# Patient Record
Sex: FEMALE | Race: WHITE
[De-identification: names, ages, dates, MRNs, and addresses within clinical notes are randomized per-mention and may not be internally consistent; named-entity substitution may affect disease eponyms.]

---

## 2018-04-18 ENCOUNTER — HOSPITAL ENCOUNTER (EMERGENCY)
Dept: HOSPITAL 97 - ER | Age: 38
Discharge: HOME | End: 2018-04-18
Payer: COMMERCIAL

## 2018-04-18 DIAGNOSIS — S60.212A: Primary | ICD-10-CM
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
## 2018-04-18 PROCEDURE — 99284 EMERGENCY DEPT VISIT MOD MDM: CPT

## 2018-04-18 PROCEDURE — 93005 ELECTROCARDIOGRAM TRACING: CPT

## 2018-04-18 PROCEDURE — 71046 X-RAY EXAM CHEST 2 VIEWS: CPT
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
## 2018-04-18 NOTE — EKG
Test Date:    2018-04-18               Test Time:    11:57:18

Technician:   GADIEL                                     

                                                     

MEASUREMENT RESULTS:                                       

Intervals:                                           

Rate:         68                                     

IA:           132                                    

QRSD:         88                                     

QT:           366                                    

QTc:          389                                    

Axis:                                                

P:            65                                     

IA:           132                                    

QRS:          80                                     

T:            62                                     

                                                     

INTERPRETIVE STATEMENTS:                                       

                                                     

Normal sinus rhythm with sinus arrhythmia

Normal ECG

Compared to ECG 02/03/2018 06:47:19

No significant changes



Electronically Signed On 04-18-18 14:52:22 CDT by Tomy Gomes
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
## 2018-07-11 ENCOUNTER — HOSPITAL ENCOUNTER (EMERGENCY)
Dept: HOSPITAL 97 - ER | Age: 38
Discharge: HOME | End: 2018-07-11
Payer: COMMERCIAL

## 2018-07-11 DIAGNOSIS — R07.89: ICD-10-CM

## 2018-07-11 DIAGNOSIS — R12: Primary | ICD-10-CM

## 2018-07-11 LAB
ALBUMIN SERPL BCP-MCNC: 3.5 G/DL (ref 3.4–5)
ALP SERPL-CCNC: 60 U/L (ref 45–117)
ALT SERPL W P-5'-P-CCNC: 37 U/L (ref 12–78)
AST SERPL W P-5'-P-CCNC: 23 U/L (ref 15–37)
BUN BLD-MCNC: 11 MG/DL (ref 7–18)
CKMB CREATINE KINASE MB: < 1 NG/ML (ref 0.3–3.6)
GLUCOSE SERPLBLD-MCNC: 101 MG/DL (ref 74–106)
HCT VFR BLD CALC: 42.8 % (ref 36–45)
INR BLD: 0.99
LYMPHOCYTES # SPEC AUTO: 2 K/UL (ref 0.7–4.9)
MAGNESIUM SERPL-MCNC: 2 MG/DL (ref 1.8–2.4)
MCH RBC QN AUTO: 31.2 PG (ref 27–35)
MCV RBC: 89.4 FL (ref 80–100)
NT-PROBNP SERPL-MCNC: 18 PG/ML (ref ?–125)
PMV BLD: 9.8 FL (ref 7.6–11.3)
POTASSIUM SERPL-SCNC: 3.4 MMOL/L (ref 3.5–5.1)
RBC # BLD: 4.79 M/UL (ref 3.86–4.86)

## 2018-07-11 PROCEDURE — 81003 URINALYSIS AUTO W/O SCOPE: CPT

## 2018-07-11 PROCEDURE — 93005 ELECTROCARDIOGRAM TRACING: CPT

## 2018-07-11 PROCEDURE — 85730 THROMBOPLASTIN TIME PARTIAL: CPT

## 2018-07-11 PROCEDURE — 85379 FIBRIN DEGRADATION QUANT: CPT

## 2018-07-11 PROCEDURE — 36415 COLL VENOUS BLD VENIPUNCTURE: CPT

## 2018-07-11 PROCEDURE — 82550 ASSAY OF CK (CPK): CPT

## 2018-07-11 PROCEDURE — 84484 ASSAY OF TROPONIN QUANT: CPT

## 2018-07-11 PROCEDURE — 85025 COMPLETE CBC W/AUTO DIFF WBC: CPT

## 2018-07-11 PROCEDURE — 82553 CREATINE MB FRACTION: CPT

## 2018-07-11 PROCEDURE — 99283 EMERGENCY DEPT VISIT LOW MDM: CPT

## 2018-07-11 PROCEDURE — 85610 PROTHROMBIN TIME: CPT

## 2018-07-11 PROCEDURE — 71045 X-RAY EXAM CHEST 1 VIEW: CPT

## 2018-07-11 PROCEDURE — 80053 COMPREHEN METABOLIC PANEL: CPT

## 2018-07-11 PROCEDURE — 80076 HEPATIC FUNCTION PANEL: CPT

## 2018-07-11 PROCEDURE — 83880 ASSAY OF NATRIURETIC PEPTIDE: CPT

## 2018-07-11 PROCEDURE — 83735 ASSAY OF MAGNESIUM: CPT

## 2018-07-11 NOTE — EKG
Test Date:    2018-07-11               Test Time:    02:54:15

Technician:   MICHAEL                                     

                                                     

MEASUREMENT RESULTS:                                       

Intervals:                                           

Rate:         74                                     

PA:           140                                    

QRSD:         96                                     

QT:           384                                    

QTc:          426                                    

Axis:                                                

P:            60                                     

PA:           140                                    

QRS:          86                                     

T:            36                                     

                                                     

INTERPRETIVE STATEMENTS:                                       

                                                     

Normal sinus rhythm with sinus arrhythmia

Normal ECG

Compared to ECG 04/18/2018 11:57:18

No significant changes



Electronically Signed On 07-11-18 06:30:15 CDT by Tomy Gomes

## 2018-07-11 NOTE — EDPHYS
Physician Documentation                                                                           

 [facility]                                                                

Name: Jordyn Bennett                                                                               

Age: 37 yrs                                                                                       

Sex: Female                                                                                       

: 1980                                                                                   

MRN: [id number]                                                                                   

Arrival Date: 2018                                                                          

Time: 02:23                                                                                       

Account#: [id number]                                                                            

Bed 7                                                                                             

Private MD: Efe Farmer H                                                                    

ED Physician Sharath Quinn                                                                      

HPI:                                                                                              

                                                                                             

03:49 This 37 yrs old  Female presents to ER via Ambulatory with complaints of R Arm ps1 

      Pain, Heartburn, Abdominal Pain.                                                            

03:49 The patient or guardian reports chest pain that is located primarily in the not pain    ps1 

      but discomfort with taking a deep breath, additionally feels like she has heartburn.        

      Onset was early this morning. Did not eat dinner. No leg swelling. On birth control. No     

      remitting or exacerbating factors. .                                                        

                                                                                                  

OB/GYN:                                                                                           

02:51 LMP 6/15/2018                                                                           mg2 

                                                                                                  

Historical:                                                                                       

- Allergies:                                                                                      

02:49 No Known Allergies;                                                                     mg2 

- Home Meds:                                                                                      

02:49 birth control [Active];                                                                 mg2 

- PMHx:                                                                                           

02:49 None;                                                                                   mg2 

- PSHx:                                                                                           

02:49 None;                                                                                   mg2 

                                                                                                  

- Immunization history:: Adult Immunizations up to date.                                          

- Social history:: Smoking status: Patient/guardian denies using tobacco.                         

- Ebola Screening: : No symptoms or risks identified at this time.                                

                                                                                                  

                                                                                                  

ROS:                                                                                              

03:49 Constitutional: Negative for fever, chills, and weight loss, Eyes: Negative for injury, ps1 

      pain, redness, and discharge, Neck: Negative for injury, pain, and swelling,                

      Cardiovascular: Negative for chest pain, palpitations, and edema, Back: Negative for        

      injury and pain, MS/Extremity: Negative for injury and deformity, Skin: Negative for        

      injury, rash, and discoloration, Neuro: Negative for headache, weakness, numbness,          

      tingling, and seizure.                                                                      

03:49 Respiratory: Positive for discomfort with deep inspiration.                                 

03:49 Abdomen/GI: Positive for dyspepsia.                                                         

                                                                                                  

Exam:                                                                                             

03:49 Constitutional:  This is a well developed, well nourished patient who is awake, alert,  ps1 

      and in no acute distress. Head/Face:  Normocephalic, atraumatic. Eyes:  Pupils equal        

      round and reactive to light, extra-ocular motions intact.  Lids and lashes normal.          

      Conjunctiva and sclera are non-icteric and not injected. Chest/axilla:  Normal chest        

      wall appearance and motion.  Nontender with no deformity.  No lesions are appreciated.      

      Cardiovascular:  Regular rate and rhythm.  No gallops, murmurs, or rubs.  Normal PMI,       

      no JVD.  No pulse deficits. Respiratory:  Lungs have equal breath sounds bilaterally,       

      clear to auscultation and percussion.  No rales, rhonchi or wheezes noted.  No              

      increased work of breathing, no retractions or nasal flaring. Abdomen/GI:  Soft,            

      non-tender, with normal bowel sounds.  No distension or tympany.  No guarding or            

      rebound.  No evidence of tenderness throughout. Skin:  Warm, dry with normal turgor.        

      Normal color with no rashes, no lesions, and no evidence of cellulitis. MS/ Extremity:      

      Pulses equal, no cyanosis.  Neurovascular intact.  Full, normal range of motion. Neuro:     

       Awake and alert, GCS 15, oriented to person, place, time, and situation.  Cranial          

      nerves II-XII grossly intact. Sensory grossly intact.                                       

                                                                                                  

Vital Signs:                                                                                      

02:51  / 125; Pulse 91; Resp 18; Temp 98.6; Pulse Ox 100% on R/A; Weight 106.59 kg;     mg2 

      Height 5 ft. 6 in. (167.64 cm); Pain 0/10;                                                  

03:30  / 70; Pulse 90; Resp 18; Pulse Ox 98% on R/A; Pain 0/10;                         mg2 

02:51 Body Mass Index 37.93 (106.59 kg, 167.64 cm)                                            mg2 

                                                                                                  

MDM:                                                                                              

03:11 Patient medically screened.                                                             ps1 

03:49 HEART Score: History: Slightly Suspicious (0), ECG: Normal (0), Age: < or = 45 years    ps1 

      (0), Risk Factors: No Risk Factors Known (0), Troponin: < or = 1 x Normal Limit (0).        

      The patient's deep vein thrombosis risk score was calculated as follows: Total Score:       

      0. This patient was found to be at low risk for a deep vein thrombosis by using the         

      Well's assessment criteria. The patient's pulmonary embolism risk score was calculated      

      as follows: Total Score: 0-2 points. This patient was found to be at low risk for a         

      pulmonary embolism by using the Well's assessment criteria. Data reviewed: vital signs,     

      nurses notes, lab test result(s), EKG, radiologic studies, plain films, and as a            

      result, I will discharge patient. Counseling: I had a detailed discussion with the          

      patient and/or guardian regarding: the historical points, exam findings, and any            

      diagnostic results supporting the discharge/admit diagnosis, lab results, radiology         

      results, the need for outpatient follow up, a cardiologist. Special discussion: Based       

      on the patient's history, exam, and Dx evaluation, there is no indication for emergent      

      intervention or inpatient Tx. It is understood by the patient/guardian that if the Sx's     

      persist or worsen they need to return immediately for re-evaluation.                        

                                                                                                  

                                                                                             

02:56 Order name: CBC with Diff; Complete Time: 03:39                                         mg2 

                                                                                             

02:56 Order name: Ckmb; Complete Time: 03:43                                                  mg2 

                                                                                             

02:56 Order name: CPK; Complete Time: 03:43                                                   mg2 

                                                                                             

02:56 Order name: LFT's; Complete Time: 03:43                                                 mg2 

                                                                                             

02:56 Order name: Magnesium; Complete Time: 03:43                                             mg2 

                                                                                             

02:56 Order name: NT PRO-BNP; Complete Time: 03:43                                            mg2 

                                                                                             

02:56 Order name: PT-INR; Complete Time: 03:39                                                mg2 

                                                                                             

02:56 Order name: Ptt, Activated; Complete Time: 03:39                                        mg2 

                                                                                             

02:56 Order name: Troponin (emerg Dept Use Only); Complete Time: 03:39                        mg2 

                                                                                             

02:56 Order name: EKG; Complete Time: 02:57                                                   mg2 

                                                                                             

02:56 Order name: Cardiac monitoring; Complete Time: 03:01                                    mg2 

                                                                                             

02:56 Order name: EKG - Nurse/Tech; Complete Time: 03:01                                      mg2 

                                                                                             

02:56 Order name: IV Saline Lock; Complete Time: 03:01                                        mg2 

                                                                                             

02:56 Order name: Labs collected and sent; Complete Time: 03:01                               mg2 

                                                                                             

02:56 Order name: O2 Per Protocol; Complete Time: 03:01                                       mg2 

                                                                                             

02:56 Order name: O2 Sat Monitoring; Complete Time: 03:01                                     mg2 

                                                                                             

02:56 Order name: Urine Dipstick-Ancillary (obtain specimen); Complete Time: 03:55            mg2 

                                                                                             

02:57 Order name: XRAY Chest (1 view)                                                         [facility] 

                                                                                             

03:10 Order name: Comprehensive Metabolic Panel; Complete Time: 03:43                         EDMS

                                                                                             

03:12 Order name: Urine Dipstick--Ancillary (enter results); Complete Time: 03:25             ms  

                                                                                             

03:15 Order name: D-Dimer; Complete Time: 03:39                                               EDMS

                                                                                             

02:57 Order name: Cardiac monitoring; Complete Time: 02:59                                    ps1 

                                                                                             

02:57 Order name: EKG - Nurse/Tech; Complete Time: 02:59                                      ps1 

                                                                                             

02:57 Order name: IV Saline Lock; Complete Time: 02:59                                        ps1 

                                                                                             

02:57 Order name: Labs collected and sent; Complete Time: 02:59                               ps1 

                                                                                             

02:57 Order name: O2 Per Protocol; Complete Time: 02:59                                       ps1 

                                                                                             

02:57 Order name: O2 Sat Monitoring; Complete Time: 03:00                                     ps1 

                                                                                             

02:57 Order name: Urine Dipstick-Ancillary (obtain specimen); Complete Time: 03:55            ps1 

                                                                                                  

Administered Medications:                                                                         

03:08 Drug: GI Cocktail without Donnatal - (Maalox Suspension 30 ml, Lidocaine Liquid 2 % 15  mg2 

      ml) Route: PO;                                                                              

03:55 Follow up: Response: No adverse reaction                                                mg2 

04:21 Follow up: Response: No adverse reaction                                                mg2 

                                                                                                  

                                                                                                  

Disposition:                                                                                      

18 03:55 Discharged to Home. Impression: Heartburn, Other chest pain.                       

- Condition is Stable.                                                                            

- Discharge Instructions: Nonspecific Chest Pain.                                                 

                                                                                                  

- Medication Reconciliation Form, Thank You Letter, Antibiotic Education, Prescription            

  Opioid Use form.                                                                                

- Follow up: Efe Farmer DO; When: As needed; Reason: Recheck today's complaints,            

  Continuance of care, Re-evaluation by your physician. Follow up: Emergency                      

  Department; When: As needed; Reason: Fever > 102 F, Trouble breathing, Worsening of             

  condition.                                                                                      

- Problem is new.                                                                                 

- Symptoms have improved.                                                                         

                                                                                                  

                                                                                                  

                                                                                                  

Signatures:                                                                                       

Dispatcher [employer]                           EDMS                                                 

Sharath Quinn MD MD   ps1                                                  

Adin Desai RN                    RN   mg2                                                  

                                                                                                  

Corrections: (The following items were deleted from the chart)                                    

03:00 02:57 Chest Single View+RAD.RAD.BRZ ordered. EDMS                                       EDMS

03:09 02:58 MAGNESIUM+C.LAB.BRZ ordered. EDMS                                                 EDMS

03:09 02:58 COMPREHENSIVE METABOLIC PANEL+C.LAB.BRZ ordered. EDMS                             EDMS

03:10 02:57 BASIC METABOLIC PANEL+C.LAB.BRZ ordered. EDMS                                     EDMS

03:15 02:58 D-DIMER+COAG.LAB.BRZ ordered. EDMS                                                EDMS

04:21 03:55 2018 03:55 Discharged to Home. Impression: Heartburn; Other chest pain.     mg2 

      Condition is Stable. Forms are Medication Reconciliation Form, Thank You Letter,            

      Antibiotic Education, Prescription Opioid Use. Follow up: Efe Farmer; When: As          

      needed; Reason: Recheck today's complaints, Continuance of care, Re-evaluation by your      

      physician. Follow up: Emergency Department; When: As needed; Reason: Fever > 102 F,         

      Trouble breathing, Worsening of condition. Problem is new. Symptoms have improved. ps1      

                                                                                                  

**************************************************************************************************

## 2018-07-11 NOTE — RAD REPORT
EXAM DESCRIPTION:  RAD - Chest Single View - 7/11/2018 3:16 am

 

CLINICAL HISTORY:  CHEST PAIN

Chest pain.

 

COMPARISON:  Chest Pa And Lat (2 Views) dated 4/18/2018; Chest Single View dated 2/3/2018

 

FINDINGS:  Portable technique limits examination quality.

 

The lungs are grossly clear. The heart is normal in size. No displaced fractures.

 

IMPRESSION:  No acute intrathoracic process suspected.

## 2018-07-11 NOTE — ER
Nurse's Notes                                                                                     

 [facility]                                                                

Name: Jordyn Bennett                                                                               

Age: 37 yrs                                                                                       

Sex: Female                                                                                       

: 1980                                                                                   

MRN: [id number]                                                                                   

Arrival Date: 2018                                                                          

Time: 02:23                                                                                       

Account#: [id number]                                                                            

Bed 7                                                                                             

Private MD: Efe Farmer H                                                                    

Diagnosis: Heartburn;Other chest pain                                                             

                                                                                                  

Presentation:                                                                                     

                                                                                             

02:47 Presenting complaint: Patient states: She had heart burn, felt light headed, pain to    mg2 

      right arm and chills at 0115. Transition of care: patient was not received from another     

      setting of care. Onset of symptoms was 2018. Risk Assessment: Do you want to       

      hurt yourself or someone else? Patient reports no desire to harm self or others.            

      Initial Sepsis Screen: Does the patient meet any 2 criteria? No. Patient's initial          

      sepsis screen is negative. Does the patient have a suspected source of infection? No.       

      Patient's initial sepsis screen is negative. Care prior to arrival: Medication(s)           

      given: ASA, 81 mg, x 1.                                                                     

02:47 Method Of Arrival: Ambulatory                                                           mg2 

02:47 Acuity: ELIA 3                                                                           mg2 

                                                                                                  

Triage Assessment:                                                                                

02:50 General: Appears uncomfortable, Behavior is calm, cooperative, appropriate for age.     mg2 

      General: Behavior is. Pain: Denies pain. EENT: No signs and/or symptoms were reported       

      regarding the EENT system. Neuro: Level of Consciousness is awake, alert, obeys             

      commands, Oriented to person, place, time, situation. Cardiovascular: Heart tones S1 S2     

      present Patient's skin is warm and dry. Respiratory: Airway is patent Respiratory           

      effort is even, unlabored, Respiratory pattern is regular, symmetrical, Breath sounds       

      are clear bilaterally. GI: Bowel sounds present X 4 quads. Abd is soft and non tender.      

      : No signs and/or symptoms were reported regarding the genitourinary system. Derm:        

      Skin is pink, warm \T\ dry.                                                                 

                                                                                                  

OB/GYN:                                                                                           

02:51 LMP 6/15/2018                                                                           mg2 

                                                                                                  

Historical:                                                                                       

- Allergies:                                                                                      

02:49 No Known Allergies;                                                                     mg2 

- Home Meds:                                                                                      

02:49 birth control [Active];                                                                 mg2 

- PMHx:                                                                                           

02:49 None;                                                                                   mg2 

- PSHx:                                                                                           

02:49 None;                                                                                   mg2 

                                                                                                  

- Immunization history:: Adult Immunizations up to date.                                          

- Social history:: Smoking status: Patient/guardian denies using tobacco.                         

- Ebola Screening: : No symptoms or risks identified at this time.                                

                                                                                                  

                                                                                                  

Screenin:54 Abuse screen: Denies threats or abuse. Nutritional screening: No deficits noted.        mg2 

      Tuberculosis screening: No symptoms or risk factors identified. Fall Risk None              

      identified.                                                                                 

                                                                                                  

Assessment:                                                                                       

03:50 Reassessment: Patient and/or family updated on plan of care and expected duration. Pain mg2 

      level reassessed. Patient is alert, oriented x 3, equal unlabored respirations, skin        

      warm/dry/pink.                                                                              

04:19 Reassessment: Patient and/or family updated on plan of care and expected duration. Pain mg2 

      level reassessed. Patient is alert, oriented x 3, equal unlabored respirations, skin        

      warm/dry/pink. Discharge instructions given to patient, verbalized the understanding of     

      instruction.                                                                                

                                                                                                  

Vital Signs:                                                                                      

02:51  / 125; Pulse 91; Resp 18; Temp 98.6; Pulse Ox 100% on R/A; Weight 106.59 kg;     mg2 

      Height 5 ft. 6 in. (167.64 cm); Pain 0/10;                                                  

03:30  / 70; Pulse 90; Resp 18; Pulse Ox 98% on R/A; Pain 0/10;                         mg2 

02:51 Body Mass Index 37.93 (106.59 kg, 167.64 cm)                                            mg2 

                                                                                                  

ED Course:                                                                                        

02:23 Patient arrived in ED.                                                                  ds1 

02:24 Efe Farmer DO is Private Physician.                                               ds1 

02:27 Sharath Quinn MD is Attending Physician.                                             ps1 

02:47 Arm band placed on right wrist. Patient placed in an exam room, on a stretcher, on      mg2 

      cardiac monitor, on pulse oximetry.                                                         

02:49 Triage completed.                                                                       mg2 

02:54 Patient has correct armband on for positive identification. Placed in gown. Bed in low  mg2 

      position. Call light in reach. Side rails up X 1.                                           

03:08 Adin Desai, RN is Primary Nurse.                                                  mg2 

03:14 X-ray completed. Portable x-ray completed in exam room. Patient tolerated procedure     kw  

      well.                                                                                       

03:15 XRAY Chest (1 view) In Process Unspecified.                                             EDMS

03:54 Efe Farmer DO is Referral Physician.                                              ps1 

04:20 No provider procedures requiring assistance completed. IV discontinued, intact,         mg2 

      bleeding controlled, No redness/swelling at site. Pressure dressing applied.                

                                                                                                  

Administered Medications:                                                                         

03:08 Drug: GI Cocktail without Donnatal - (Maalox Suspension 30 ml, Lidocaine Liquid 2 % 15  mg2 

      ml) Route: PO;                                                                              

03:55 Follow up: Response: No adverse reaction                                                mg2 

04:21 Follow up: Response: No adverse reaction                                                mg2 

                                                                                                  

                                                                                                  

Outcome:                                                                                          

03:55 Discharge ordered by MD.                                                                ps1 

04:20 Discharged to home ambulatory, with significant other.                                  mg2 

04:20 Condition: improved                                                                         

04:20 Discharge instructions given to patient, Instructed on discharge instructions, follow       

      up and referral plans. Demonstrated understanding of instructions, follow-up care.          

04:21 Patient left the ED.                                                                    mg2 

                                                                                                  

Signatures:                                                                                       

Dispatcher [employer]                           EDMS                                                 

Albert Celestei                                ds1                                                  

Marcelina Murguia Phillip, MD MD   ps1                                                  

Adin Desai RN                    RN   mg2                                                  

                                                                                                  

**************************************************************************************************

## 2025-03-01 ENCOUNTER — HOSPITAL ENCOUNTER (EMERGENCY)
Dept: HOSPITAL 97 - ER | Age: 45
Discharge: HOME | End: 2025-03-01
Payer: COMMERCIAL

## 2025-03-01 VITALS — TEMPERATURE: 97.7 F

## 2025-03-01 VITALS — SYSTOLIC BLOOD PRESSURE: 111 MMHG | DIASTOLIC BLOOD PRESSURE: 55 MMHG | OXYGEN SATURATION: 97 %

## 2025-03-01 DIAGNOSIS — Z98.84: ICD-10-CM

## 2025-03-01 DIAGNOSIS — D50.9: Primary | ICD-10-CM

## 2025-03-01 LAB
ALBUMIN SERPL BCP-MCNC: 3.8 G/DL (ref 3.4–5)
ALBUMIN/GLOB SERPL: 1.2 {RATIO} (ref 1.1–1.8)
ALP SERPL-CCNC: 66 U/L (ref 45–117)
ALT SERPL W P-5'-P-CCNC: 24 U/L (ref 13–56)
ANION GAP SERPL CALC-SCNC: 7.6 MEQ/L (ref 5–15)
APTT BLD: 30.5 SECONDS (ref 24.3–36.9)
AST SERPL W P-5'-P-CCNC: 15 U/L (ref 15–37)
BUN BLD-MCNC: 9 MG/DL (ref 7–18)
FERRITIN SERPL-MCNC: 8.4 NG/ML (ref 8–252)
GLOBULIN SER CALC-MCNC: 3.3 G/DL (ref 2.3–3.5)
GLUCOSE SERPLBLD-MCNC: 105 MG/DL (ref 74–106)
HCT VFR BLD CALC: 37.9 % (ref 36–45)
HGB BLD-MCNC: 12.6 G/DL (ref 12–15)
INR BLD: 1.06
IRON SERPL-MCNC: 43 UG/DL (ref 50–170)
LYMPHOCYTES # SPEC AUTO: 1.6 K/UL (ref 0.7–4.9)
MCH RBC QN AUTO: 26.7 PG (ref 27–35)
MCHC RBC AUTO-ENTMCNC: 33.2 G/DL (ref 32–36)
MCV RBC: 80.3 FL (ref 80–100)
NRBC # BLD: 0 10*3/UL (ref 0–0)
NRBC BLD AUTO-RTO: 0.1 % (ref 0–0)
PMV BLD: 9.5 FL (ref 7.6–11.3)
POTASSIUM SERPL-SCNC: 3.6 MEQ/L (ref 3.5–5.1)
PROTHROMBIN TIME: 12.1 SECONDS (ref 10–13)
RBC # BLD: 4.72 M/UL (ref 3.86–4.86)
TRANSFERRIN SERPL-MCNC: 275 MG/DL (ref 200–360)
WBC # BLD AUTO: 5.5 THOU/UL (ref 4.3–10.9)

## 2025-03-01 PROCEDURE — 86850 RBC ANTIBODY SCREEN: CPT

## 2025-03-01 PROCEDURE — 80053 COMPREHEN METABOLIC PANEL: CPT

## 2025-03-01 PROCEDURE — 83540 ASSAY OF IRON: CPT

## 2025-03-01 PROCEDURE — 85730 THROMBOPLASTIN TIME PARTIAL: CPT

## 2025-03-01 PROCEDURE — 36415 COLL VENOUS BLD VENIPUNCTURE: CPT

## 2025-03-01 PROCEDURE — 85610 PROTHROMBIN TIME: CPT

## 2025-03-01 PROCEDURE — 86901 BLOOD TYPING SEROLOGIC RH(D): CPT

## 2025-03-01 PROCEDURE — 86900 BLOOD TYPING SEROLOGIC ABO: CPT

## 2025-03-01 PROCEDURE — 82728 ASSAY OF FERRITIN: CPT

## 2025-03-01 PROCEDURE — 84466 ASSAY OF TRANSFERRIN: CPT

## 2025-03-01 PROCEDURE — 85025 COMPLETE CBC W/AUTO DIFF WBC: CPT

## 2025-03-01 NOTE — EDPHYS
Physician Documentation                                                                           

 [facility]                                                                 

Name: Jordyn Bennett                                                                               

Age: 44 yrs                                                                                       

Sex: Female                                                                                       

: 1980                                                                                   

MRN: [id number]                                                                                   

Arrival Date: 2025                                                                          

Time: 14:08                                                                                       

Account#: [id number]                                                                            

Bed 6                                                                                             

Private MD:                                                                                       

ED Physician Vicente Pierre                                                                        

HPI:                                                                                              

                                                                                             

18:14 This 44 yrs old  Female presents to ER via Ambulatory with complaints of       dr5 

      Dizziness.                                                                                  

18:14 The patient presents with dizziness. Onset: The symptoms/episode began/occurred         dr5 

      yesterday. Patient is a 44-year-old female with history of iron deficiency anemia           

      coming in with dizziness and lightheadedness that started yesterday and worsens when        

      she stands up and or ambulates. Patient has history of gastric sleeve and does not take     

      iron due to absorption.                                                                     

                                                                                                  

OB/GYN:                                                                                           

14:31 LMP 2025, Pregnancy unknown                                                        ss  

                                                                                                  

Historical:                                                                                       

- Allergies:                                                                                      

14:31 No Known Allergies;                                                                     ss  

- PMHx:                                                                                           

14:31 iron deficiency anemia;                                                                 ss  

- PSHx:                                                                                           

14:31 gastric bypass;                                                                         ss  

                                                                                                  

- Infectious Disease History:: Denies.                                                            

- Social history:: Smoking status: Patient denies any tobacco usage or history of.                

                                                                                                  

                                                                                                  

ROS:                                                                                              

18:14 Constitutional: as per hpi                                                              dr5 

                                                                                                  

Exam:                                                                                             

18:14 Constitutional:  This is a well developed, well nourished patient who is awake, alert,  dr5 

      and in no acute distress. Head/Face:  Normocephalic, atraumatic. Eyes:  Pupils equal        

      round and reactive to light, extra-ocular motions intact.  Lids and lashes normal.          

      Conjunctiva and sclera are non-icteric, pale, and not injected.  Cornea within normal       

      limits.  Periorbital areas with no swelling, redness, or edema. ENT:  Nares patent. No      

      nasal discharge, no septal abnormalities noted.  Tympanic membranes are normal and          

      external auditory canals are clear.  Oropharynx with no redness, swelling, or masses,       

      exudates, or evidence of obstruction, uvula midline.  Mucous membranes moist. Neck:         

      Trachea midline, no thyromegaly or masses palpated, and no cervical lymphadenopathy.        

      Supple, full range of motion without nuchal rigidity, or vertebral point tenderness.        

      No Meningismus. Chest/axilla:  Normal chest wall appearance and motion.  Nontender with     

      no deformity.  No lesions are appreciated. Cardiovascular:  Regular rate and rhythm         

      with a normal S1 and S2. Normal PMI, no JVD. No pulse deficits. Respiratory:  Lungs         

      have equal breath sounds bilaterally, clear to auscultation.  No rales, rhonchi or          

      wheezes noted. No increased work of breathing, no retractions or nasal flaring. Back:       

      No spinal tenderness.  No costovertebral tenderness.  Full range of motion. Skin:           

      Warm, dry with normal turgor.  Normal color with no rashes, no lesions, and no evidence     

      of cellulitis. MS/ Extremity:  Pulses equal, no cyanosis.  Neurovascular intact.  Full,     

      normal range of motion.                                                                     

                                                                                                  

Vital Signs:                                                                                      

14:26  / 86; Pulse 86; Resp 15; Temp 97.7(O); Pulse Ox 100% ; Weight 69.4 kg; Height 5  ss  

      ft. 6 in. ; Pain 0/10;                                                                      

17:42  / 55; Pulse 67; Resp 16; Pulse Ox 97% on R/A;                                    iw  

14:26 Body Mass Index 24.69 (69.40 kg, 167.64 cm)                                             ss  

14:26 Pain Scale: Adult                                                                       ss  

                                                                                                  

MDM:                                                                                              

14:15 Medical Screening Exam initiated                                                        dr5 

18:14 Differential diagnosis: vertigo, Iron Deficiency Anemia, Anemia. Data reviewed: vital   dr5 

      signs, nurses notes. I considered the following discharge prescriptions or medication       

      management in the emergency department Medications were administered in the Emergency       

      Department. See MAR. Care significantly affected by the following Social Determinants       

      of Health: Poor access to healthcare and/or lack of insurance, Poor access to               

      transportation, Problems related to employment. Counseling: I had a detailed discussion     

      with the patient and/or guardian regarding the historical points, exam findings, and        

      any diagnostic results supporting the discharge/admit diagnosis, the presence of at         

      least one elevated blood pressure reading (>120/80) during this emergency department        

      visit, lab results, the need for outpatient follow up, for definitive care, a family        

      practitioner, to return to the emergency department if symptoms worsen or persist or if     

      there are any questions or concerns that arise at home. ED course: Patient's hemoglobin     

      was normal but patient had low iron. Gave iron infusion and patient will follow-up          

      primary care doctor this week. Patient reports that all symptoms have resolved and he       

      still feeling much better. Patient will return to ER for worsening symptoms or change.      

      Patient denies dizziness and lightheadedness on discharge.                                  

                                                                                                  

                                                                                             

14:29 Order name: CBC with Diff; Complete Time: 14:57                                         dr5 

                                                                                             

14:29 Order name: Protime (+inr); Complete Time: 15:05                                        dr5 

                                                                                             

14:29 Order name: Ptt, Activated; Complete Time: 15:05                                        dr5 

                                                                                             

14:29 Order name: CMP; Complete Time: 15:16                                                   dr5 

                                                                                             

14:29 Order name: Type And Screen; Complete Time: 15:54                                       dr5 

                                                                                             

14:29 Order name: TIBC; Complete Time: 15:16                                                  dr5 

                                                                                             

14:29 Order name: Ferritin; Complete Time: 15:16                                              dr5 

                                                                                             

15:45 Order name: ABO/RH no charge; Complete Time: 15:54                                      EDMS

                                                                                             

14:29 Order name: EKG; Complete Time: 14:29                                                   dr5 

                                                                                             

14:29 Order name: Cardiac monitoring; Complete Time: 15:00                                    dr5 

                                                                                             

14:29 Order name: EKG - Nurse/Tech; Complete Time: 15:00                                      [facility] 

                                                                                             

14:29 Order name: IV Saline Lock; Complete Time: 14:52                                        [facility] 

                                                                                             

14:29 Order name: Labs collected and sent; Complete Time: 14:52                               dr5 

                                                                                             

14:29 Order name: NPO; Complete Time: 14:52                                                   dr5 

                                                                                             

14:29 Order name: O2 Per Protocol; Complete Time: 14:52                                       [facility] 

                                                                                             

14:29 Order name: O2 Sat Monitoring; Complete Time: 14:52                                     dr5 

                                                                                                  

EC:54 Rate is 81 beats/min. Rhythm is regular. QRS Axis is Normal. TX interval is normal at   dr5 

      140 msec. QRS interval is normal at 96 msec. QT interval is normal at 366 msec.             

                                                                                                  

Administered Medications:                                                                         

15:16 Drug: NS 0.9% IV 1000 ml IV at 1000 ml once; to be given as a bolus over 60 minutes     aa5 

      Route: IV; Rate: 1000 ml; Site: right antecubital;                                          

16:30 Follow up: IV Status: Completed infusion                                                iw  

15:23 Not Given (Other Intervention Used; Placed in meditech): iron sucrose 300 mg IV at per    

      protocol once; administer over 2 hrs                                                        

                                                                                                  

                                                                                                  

Disposition Summary:                                                                              

25 17:27                                                                                    

Discharge Ordered                                                                                 

 Notes:       Location: Home                                                                        
  dr5

      Condition: Stable                                                                       dr5 

      Diagnosis                                                                                   

        - Iron deficiency anemia, unspecified                                                 dr5 

      Followup:                                                                               dr5 

        - With: Emergency Department                                                               

        - When: As needed                                                                          

        - Reason: Worsening of condition                                                           

      Followup:                                                                               dr5 

        - With: Private Physician                                                                  

        - When: 1 - 2 days                                                                         

        - Reason: Recheck today's complaints, Continuance of care, Re-evaluation by your           

      physician                                                                                   

      Discharge Instructions:                                                                     

        - Discharge Summary Sheet                                                             dr5 

        - Iron Deficiency Anemia, Adult                                                       dr5 

      Forms:                                                                                      

        - Medication Reconciliation Form                                                      dr5 

        - Patient Portal Instructions                                                         dr5 

        - Leadership Thank You Letter                                                         dr5 

Signatures:                                                                                       

Dispatcher [employer]                           Judie Cat RN                     RN   aa5                                                  

Mary Berman RN                   RN   ss                                                   

Crow Woodard, IESHA-C                   FNP-Cdr5                                                  

Jackelyn Franco RN   iw                                                   

                                                                                                  

**************************************************************************************************

## 2025-03-01 NOTE — XMS REPORT
Continuity of Care Document



                            Created on: 2025





JORDYN BENNETT

External Reference #: [id number]

: 1980

Sex: Female



Demographics





                                        Address             [address] [place]  [address]

 

                                        Home Phone          [phone]

 

                                        Work Phone          [phone]

 

                                        Mobile Phone        [phone]

 

                                        Email Address       [email or web address]

 

                                        Preferred Language  English

 

                                        Marital Status      Unknown

 

                                        Jew Affiliation Unknown

 

                                        Race                Unknown

 

                                        Additional Race(s)  Unavailable

 

                                        Ethnic Group        Unknown





Author





                                        Name                Unknown

 

                                        Address             [address]

[place]  [address]

 

                                        [facility]

[facility]

 

                                        Address             [address]

[address]

[place]  [address]

 

                                        Phone               [phone]





Support





                          Name         Relationship Address      Phone

 

                          Jessica Abarca  Mother       Unknown      Unavailable

 

                          Cristopher Bennett Spouse       Unknown      [phone]

 

                                S, S            SA              .

.

,   [id number]                           [phone]

 

                                CRISTOPHER BENNETT    SPOU            [address] [place] [address]                 [phone]

 

                                CRISTOPHER BENNETT    Personal Relationship [address] [place] [address]                 [phone]

 

                                CRISTOPHER BENNETT    SP              [address] [place] [address]                 [phone]

 

                                CRISTOPHER BENNETT    SP              [address] [place] [address]                 [phone]

 

                                Jordyn Bennett  Personal Relationship [address] [place]  [address]                 [phone]

 

                                Jordyn Bennett  Personal Relationship [address] [place]  [id number] [phone]





Care Team Providers





                                Care Team Member Name Role            Phone

 

                                Pcp, Patient Does Not Have A Primary Care Physic

harish [phone]

 

                                Zunilda Mccray Attending Clinician Unavailable

 

                                Maria D Rodriguez Attending Clinician Unavailabl

karyna WYMAN_Leon_Gracia Attending Clinician Unavailab

Bola Shaver Attending Clinician Unavailable

 

                                Cortez Pickard   Attending Clinician Unavailable

 

                                Summer RN, Gissell SOTELO Attending Clinician Unavailab

le

 

                                Only, Ang Db Test Attending Clinician Unavailroberto

e

 

                                Ping Levi Attending Clinician [phone]

 

                                PING VERGARA Attending Clinician Unavailabl

e

 

                                Doctor Unassigned, No Name Attending Clinician U

chris Stringer, Marta Attending Clinician Unavailable

 

                                Maria D Rodriguez Admitting Clinician Unavailroberto WYMAN_Leon_Gracia Admitting Clinician Unavailab

le

 

                                KNOW, DOES_NOT  Admitting Clinician Unavailable

 

                                Bola Minaya Admitting Clinician Unavailable

 

                                ZUNILDA MCCRAY   Admitting Clinician Unavailable

 

                                Physician, No Primary or Family Admitting Clinic

harish Unavailable







Payers





                    Payer Name Policy Type Policy Number Effective Date Expirati

on Date Source

 

                                                    Blue Cross 

[employer] of 

[place]                        6                         [id number]

2022 

00:00:00                                            [facility]

 

                                                    [place] 

[place]                                         [id number]

2023 

00:00:00                                            







Problems





                                                    Condition 

Name                                    Condition 

Details                                 Condition 

Category                  Status                    Onset 

Date                                    Resolution 

Date                                    Last 

Treatment 

Date                                    Treating 

Clinician                 Comments                  Source

 

                                                    Disorder 

of coccyx                               Disorder 

of Coccyx           Problem             Active               

00:00:

00                                                               [facility]

[facility]

 

                                                    S/P 

gastric 

sleeve 

procedure                               S/P 

gastric 

sleeve 

procedure Problem                                                 [facility]

 

                                        [id number]           Thyroid 

nodule  Problem                                                 [facility]

 

                                                    [id number]

06                                      Reactive 

airway 

disease 

without 

complicati

on, 

unspecifie

d asthma 

severity, 

unspecifie

d whether 

persistent Problem                                                 [facility]

 

                                        [id number]            Allergic 

rhinitis, 

unspecifie

d 

seasonalit

y, 

unspecifie

d trigger Problem                                                 [facility]

 

                                        [id number]            MTHFR gene 

mutation Problem                                                 [facility]

 

                                        [id number]            Eczema, 

unspecifie

d type  Problem                                                 [facility]

 

                                        [id number]           Mixed 

hyperlipid

emia    Problem                                                 [facility]

 

                                        [id number]           Body mass 

index 

[BMI] 

38.0-38.9, 

adult   Problem                                                 [facility]

 

                                        [id number]             Enlarged 

thyroid Problem                                                 [facility]

 

                                        [id number]            Iron 

deficiency 

anemia, 

unspecifie

d iron 

deficiency 

anemia 

type    Problem                                                 [facility]

 

                                        [id number]           Environmen

juani 

allergies Problem                                                 [facility]

 

                                        [id number]           Lung 

nodule, 

solitary Problem                                                 [facility]

 

                                        [id number]           Other 

obesity 

due to 

excess 

calories Problem                                                 [facility]

 

                                        [id number]           Gastro-eso

phageal 

reflux 

disease 

without 

esophagiti

s       Problem                                                 [facility]

 

                                        [id number]            Vitamin D 

deficiency 

disease Problem                                                 [facility]







Allergies, Adverse Reactions, Alerts





                                                    Allergy 

Name                                    Allergy 

Type            Status          Severity        Reaction(s)     Onset 

Date                                    Inactive 

Date                                    Treating 

Clinician                 Comments                  Source

 

                                                    No Known 

Drug 

Intolera

nces         DA           Active       U            NONE         2024 

00:00:

00                                                              [facility] 

[facility]

 

                                                    No Known 

Allergie

s            DA           Active       U                          

00:00:

00                                                              [facility]

 

                                                    No Known 

Drug 

Intolera

nces         DA           Active       U            NONE          

00:00:

00                                                              [facility]

 

                                                    No Known 

Drug 

Intolera

nces         DA           Active       U                          

00:00:

00                                                              [facility]

 

                                                    NO KNOWN 

ALLERGIE

S                                       Drug 

Class   Active                                                  [facility]

[facility]







Social History





                    Social Habit Start Date Stop Date Quantity  Comments  Source

 

                    History of Tobacco Use                                      

   [facility]

 

                    Sex Assigned At Birth                                       

  [facility]

 

                                                    Exposure to SARS-CoV-2 

(event)                                Yes                       [facility]







                          Smoking Status Start Date   Stop Date    Source

 

                          Never Smoker                           [facility]

 

                          Unknown if ever smoked                           [facility]







Medications





                                                    Ordered 

Medication 

Name                                    Filled 

Medication 

Name                                    Start 

Date                                    Stop 

Date                                    Current 

Medication?                             Ordering 

Clinician       Indication      Dosage          Frequency       Signature 

(SIG)               Comments            Components          Source

 

                                                    Zepbound 

2.5 

MG/0.5ML                                Zepbound 

2.5 

MG/0.5ML                                

2- 

00:00:

00                  No                            .5{ml}              Zepbound 

2.5 

MG/0.5ML                                                    

 

                                                    Kenalog 

(Triamcinol

one)                                    Kenalog 

(Triamcinol

one)                                    

5- 

00:00:

00            No                   40mg                               [facility]

 

                                                    Triamcinolo

ne 

Acetonide 

0.5 %                                   Triamcinolo

ne 

Acetonide 

0.5 %                                   2021

0-12 

00:00:

00                        No                                     1{appli

cation}                   BID                       Triamcinol

one 

Acetonide 

0.5 %                                                       

 

                                                    Altavera 

(28) 0.15 

mg-0.03 mg 

tablet TAKE 

ONE (1) 

TABLET(S) 

BY MOUTH 

ONCE A DAY 

CONTIUOUSLY 

AND SKIP 

PLACEBOS.                               Altavera 

(28) 0.15 

mg-0.03 mg 

tablet TAKE 

ONE (1) 

TABLET(S) 

BY MOUTH 

ONCE A DAY 

CONTIUOUSLY 

AND SKIP 

PLACEBOS.                 No                                      Altavera 

(28) 0.15 

mg-0.03 mg 

tablet 

TAKE ONE 

(1) 

TABLET(S) 

BY MOUTH 

ONCE A DAY 

CONTIUOUSL

Y AND SKIP 

PLACEBOS.                                                   [facility]

[facility]

 

                                                    amoxicillin 

500 mg 

capsule 

TAKE ONE 

(1) 

CAPSULE(S) 

BY MOUTH 

THREE TIMES 

A DAY.                                  amoxicillin 

500 mg 

capsule 

TAKE ONE 

(1) 

CAPSULE(S) 

BY MOUTH 

THREE TIMES 

A DAY.                  No                                      amoxicilli

n 500 mg 

capsule 

TAKE ONE 

(1) 

CAPSULE(S) 

BY MOUTH 

THREE 

TIMES A 

DAY.                                                        [facility]

[facility]

 

                                                    amoxicillin 

875 mg 

tablet TAKE 

ONE (1) 

TABLET(S) 

BY MOUTH 

EVERY 

TWELVE 

HOURS.                                  amoxicillin 

875 mg 

tablet TAKE 

ONE (1) 

TABLET(S) 

BY MOUTH 

EVERY 

TWELVE 

HOURS.                  No                                      amoxicilli

n 875 mg 

tablet 

TAKE ONE 

(1) 

TABLET(S) 

BY MOUTH 

EVERY 

TWELVE 

HOURS.                                                      [facility]

[facility]

 

                                                    amoxicillin 

875 

mg-potassiu

m 

clavulanate 

125 mg 

tablet TAKE 

ONE (1) 

TABLET(S) 

BY MOUTH 

TWICE A 

DAY.                                    amoxicillin 

875 

mg-potassiu

m 

clavulanate 

125 mg 

tablet TAKE 

ONE (1) 

TABLET(S) 

BY MOUTH 

TWICE A 

DAY.                    No                                      amoxicilli

n 875 

mg-potassi

um 

clavulanat

e 125 mg 

tablet 

TAKE ONE 

(1) 

TABLET(S) 

BY MOUTH 

TWICE A 

DAY.                                                        [facility]

[facility]

 

                                                    azelaic 

acid 15 % 

topical gel 

APPLY TO 

AFFECTED 

AREA TWICE 

A DAY.                                  azelaic 

acid 15 % 

topical gel 

APPLY TO 

AFFECTED 

AREA TWICE 

A DAY.                  No                                      azelaic 

acid 15 % 

topical 

gel APPLY 

TO 

AFFECTED 

AREA TWICE 

A DAY.                                                      [facility]

[facility]

 

                                                    azithromyci

n 250 mg 

tablet TAKE 

2 TABLETS 

BY MOUTH ON 

DAY 1, THEN 

1 TABLET 

DAILY ON 

DAYS 2 TO 

5.                                      azithromyci

n 250 mg 

tablet TAKE 

2 TABLETS 

BY MOUTH ON 

DAY 1, THEN 

1 TABLET 

DAILY ON 

DAYS 2 TO 

5.                      No                                      azithromyc

in 250 mg 

tablet 

TAKE 2 

TABLETS BY 

MOUTH ON 

DAY 1, 

THEN 1 

TABLET 

DAILY ON 

DAYS 2 TO 

5.                                                          [facility]

[facility]

 

                                                    dexamethaso

ne 4 mg 

tablet TAKE 

TWO (2) 

TABLET(S) 

BY MOUTH 

ONCE ON 

DAYS 1 AND 

2, THEN 

TAKE ONE 

(1) TABLET 

ONCE ON DAY 

3.                                      dexamethaso

ne 4 mg 

tablet TAKE 

TWO (2) 

TABLET(S) 

BY MOUTH 

ONCE ON 

DAYS 1 AND 

2, THEN 

TAKE ONE 

(1) TABLET 

ONCE ON DAY 

3.                      No                                      dexamethas

one 4 mg 

tablet 

TAKE TWO 

(2) 

TABLET(S) 

BY MOUTH 

ONCE ON 

DAYS 1 AND 

2, THEN 

TAKE ONE 

(1) TABLET 

ONCE ON 

DAY 3.                                                      [facility]

[facility]

 

                                                    fluconazole 

150 mg 

tablet TAKE 

ONE (1) 

TABLET(S) 

BY MOUTH 

EVERY OTHER 

DAY.                                    fluconazole 

150 mg 

tablet TAKE 

ONE (1) 

TABLET(S) 

BY MOUTH 

EVERY OTHER 

DAY.                    No                                      fluconazol

e 150 mg 

tablet 

TAKE ONE 

(1) 

TABLET(S) 

BY MOUTH 

EVERY 

OTHER DAY.                                                  [facility]

[facility]

 

                                                    hydrocodone 

7.5 

mg-acetamin

ophen 325 

mg tablet 

TAKE ONE 

(1) 

TABLET(S) 

BY MOUTH 

FOUR TIMES 

A DAY AS 

NEEDED.                                 hydrocodone 

7.5 

mg-acetamin

ophen 325 

mg tablet 

TAKE ONE 

(1) 

TABLET(S) 

BY MOUTH 

FOUR TIMES 

A DAY AS 

NEEDED.                 No                                      hydrocodon

e 7.5 

mg-acetami

nophen 325 

mg tablet 

TAKE ONE 

(1) 

TABLET(S) 

BY MOUTH 

FOUR TIMES 

A DAY AS 

NEEDED.                                                     [facility]

[facility]

 

                                                    ibuprofen 

800 mg 

tablet TAKE 

ONE (1) 

TABLET(S) 

BY MOUTH 

EVERY SIX 

HOURS WITH 

FOOD.                                   ibuprofen 

800 mg 

tablet TAKE 

ONE (1) 

TABLET(S) 

BY MOUTH 

EVERY SIX 

HOURS WITH 

FOOD.                   No                                      ibuprofen 

800 mg 

tablet 

TAKE ONE 

(1) 

TABLET(S) 

BY MOUTH 

EVERY SIX 

HOURS WITH 

FOOD.                                                       [facility]

[facility]

 

                                                    methylpredn

isolone 4 

mg tablets 

in a dose 

pack TAKE 

BY MOUTH AS 

DIRECTED.                               methylpredn

isolone 4 

mg tablets 

in a dose 

pack TAKE 

BY MOUTH AS 

DIRECTED.                 No                                      methylpred

nisolone 4 

mg tablets 

in a dose 

pack TAKE 

BY MOUTH 

AS 

DIRECTED.                                                   [facility]

[facility]

 

                                                    rabeprazole 

20 mg 

tablet,camila

yed release 

TAKE ONE 

(1) 

TABLET(S) 

BY MOUTH 

ONCE A DAY.                             rabeprazole 

20 mg 

tablet,camila

yed release 

TAKE ONE 

(1) 

TABLET(S) 

BY MOUTH 

ONCE A DAY.                 No                                      rabeprazol

e 20 mg 

tablet,del

ayed 

release 

TAKE ONE 

(1) 

TABLET(S) 

BY MOUTH 

ONCE A 

DAY.                                                        [facility]

[facility]

 

                                                    spironolact

one 100 mg 

tablet TAKE 

ONE (1) 

TABLET(S) 

BY MOUTH 

DAILY.                                  spironolact

one 100 mg 

tablet TAKE 

ONE (1) 

TABLET(S) 

BY MOUTH 

DAILY.                  No                                      spironolac

tone 100 

mg tablet 

TAKE ONE 

(1) 

TABLET(S) 

BY MOUTH 

DAILY.                                                      [facility]

[facility]

 

                                                    triamcinolo

ne 

acetonide 

0.5 % 

topical 

ointment 

APPLY TO 

AFFECTED 

AREA TWICE 

A DAY.                                  triamcinolo

ne 

acetonide 

0.5 % 

topical 

ointment 

APPLY TO 

AFFECTED 

AREA TWICE 

A DAY.                  No                                      triamcinol

one 

acetonide 

0.5 % 

topical 

ointment 

APPLY TO 

AFFECTED 

AREA TWICE 

A DAY.                                                      [facility]

[facility]

 

                                                    triazolam 

0.25 mg 

tablet 

BRING TWO 

TABLETS TO 

DENTAL 

APPOINTMENT

.                                       triazolam 

0.25 mg 

tablet 

BRING TWO 

TABLETS TO 

DENTAL 

APPOINTMENT

.                       No                                      triazolam 

0.25 mg 

tablet 

BRING TWO 

TABLETS TO 

DENTAL 

APPOINTMEN

T.                                                          [facility]

[facility]

 

          Flonase Flonase           No                       Flonase           

 

                                                    ZyrTEC 

Allergy 10 

MG                                      ZyrTEC 

Allergy 10 

MG                               No                               1{table

t}                        QD                        ZyrTEC 

Allergy 10 

MG                                                          

 

                                                    RABEprazole 

Sodium 20 

MG                                      RABEprazole 

Sodium 20 

MG                               No                               1{table

t}                        QD                        RABEprazol

e Sodium 

20 MG                                                       

 

                                                    Soolantra 1 

%                                       Soolantra 1 

%                                No                               1{appli

cation}                   QD                        Soolantra 

1 %                                                         







Immunizations





                                                    Ordered 

Immunization Name                       Filled 

Immunization Name Date            Status          Comments        Source

 

                                                    Flucelvax (ccIIV4) 

- SDS - 0.5mL                           Flucelvax (ccIIV4) 

- SDS - 0.5mL                           2023 

11:23:00            [facility]

 

                                                    Flucelvax - single 

dose syringe                            Flucelvax - single 

dose syringe                            2023 

11:23:00            [facility]

 

                                                    Flucelvax (ccIIV4) 

- MDV - 0.5mL                           Flucelvax (ccIIV4) 

- MDV - 0.5mL                           2022 

11:10:00            [facility]

 

                                                    Flucelvax - 

multidose vial                          Flucelvax - 

multidose vial                          2022 

11:10:00            Completed                               [facility]

 

                                                    Flucelvax - 

multidose vial                          Flucelvax - 

multidose vial                          2022 

11:10:00            Completed                               [facility]

 

                                                    Flucelvax - 

multidose vial                          Flucelvax - 

multidose vial                          2022 

11:10:00            Completed                               [facility]

 

                                Boostrix (Tdap) Boostrix (Tdap) 2022 

11:09:00            Completed                               [facility]

 

                                Boostrix (Tdap) Boostrix (Tdap) 2022 

11:09:00            Completed                               [facility]

 

                                Boostrix (Tdap) Boostrix (Tdap) 2022 

11:09:00            Completed                               [facility]

 

                                Boostrix (Tdap) Boostrix (Tdap) 2022 

11:09:00            Completed                               [facility]

 

                                                    SARS-COV-2 COVID-19 

LYNNETTE/J&J VACCINE                                 2021 

00:00:00            Completed                               [facility]

 

                                                    SARS-COV-2 COVID-19 

LYNNETTE/J&J VACCINE                                 2021 

00:00:00            Completed                               [facility]

 

                                                    SARS-COV-2 COVID-19 

LYNNETTE/J&J VACCINE                                 2021 

00:00:00            Completed                               [facility]

 

                                                    SARS-COV-2 COVID-19 

LYNNETTE/J&J VACCINE                                 2021 

00:00:00            Completed                               [facility]

 

                                                    Fluarix (IIV4) - 

SDS - 0.5mL                             Fluarix (IIV4) - 

SDS - 0.5mL                             2020-10-19 

11:36:00            Completed                               [facility]

 

                                        Afluria single dose Afluria single 

dose                                    2020-10-19 

11:36:00            Completed                               [facility]

 

                                        Afluria single dose Afluria single 

dose                                    2020-10-19 

11:36:00            Completed                               [facility]

 

                                        Afluria single dose Afluria single 

dose                                    2020-10-19 

11:36:00            Completed                               [facility]

 

                                        Afluria single dose Afluria single 

dose                                    2019-10-10 

11:04:00            Completed                               [facility]

 

                                        Afluria single dose Afluria single 

dose                                    2019-10-10 

11:04:00            Completed                               [facility]

 

                                        Afluria single dose Afluria single 

dose                                    2019-10-10 

11:04:00            Completed                               [facility]

 

                                        Afluria single dose Afluria single 

dose                                    2019-10-10 

11:04:00            Completed                               [facility]

 

                                        Afluria single dose Afluria single 

dose            Unknown         Completed                       [facility]

 

                                                    Flucelvax - 

multidose vial                          Flucelvax - 

multidose vial  Unknown         Completed                       [facility]

 

                                                    Flucelvax - single 

dose syringe                            Flucelvax - single 

dose syringe    Unknown         Completed                       [facility]

 

                    Boostrix (Tdap) Boostrix (Tdap) Unknown   Completed         

  [facility]

 

                                        Afluria single dose Afluria single 

dose            Unknown         Completed                       [facility]

 

                                                    Flucelvax - 

multidose vial                          Flucelvax - 

multidose vial  Unknown         Completed                       [facility]

 

                                                    Flucelvax - single 

dose syringe                            Flucelvax - single 

dose syringe    Unknown         Completed                       [facility]

 

                    Boostrix (Tdap) Boostrix (Tdap) Unknown   Completed         

  [facility]

 

                                                    Afluria (IIV4) - 3 

years and older - 

SDS - 0.5mL                             Afluria (IIV4) - 3 

years and older - 

SDS - 0.5mL     Unknown         Completed                       [facility]

 

                                                    Flucelvax (ccIIV4) 

- MDV - 0.5mL                           Flucelvax (ccIIV4) 

- MDV - 0.5mL   Unknown         Completed                       [facility]

 

                                                    Flucelvax (ccIIV4) 

- SDS - 0.5mL                           Flucelvax (ccIIV4) 

- SDS - 0.5mL   Unknown         Completed                       [facility]

 

                    Boostrix (Tdap) Boostrix (Tdap) Unknown   Completed         

  [facility]

 

                                                    Afluria (IIV4) - 3 

years and older - 

SDS - 0.5mL                             Afluria (IIV4) - 3 

years and older - 

SDS - 0.5mL     Unknown         Completed                       [facility]

 

                                                    Flucelvax (ccIIV4) 

- MDV - 0.5mL                           Flucelvax (ccIIV4) 

- MDV - 0.5mL   Unknown         Completed                       [facility]

 

                                                    Flucelvax (ccIIV4) 

- SDS - 0.5mL                           Flucelvax (ccIIV4) 

- SDS - 0.5mL   Unknown         Completed                       [facility]

 

                    Boostrix (Tdap) Boostrix (Tdap) Unknown   Completed         

  [facility]

 

                                                    Afluria (IIV4) - 3 

years and older - 

SDS - 0.5mL                             Afluria (IIV4) - 3 

years and older - 

SDS - 0.5mL     Unknown         Completed                       [facility]

 

                                                    Flucelvax (ccIIV4) 

- MDV - 0.5mL                           Flucelvax (ccIIV4) 

- MDV - 0.5mL   Unknown         Completed                       [facility]

 

                                                    Flucelvax (ccIIV4) 

- SDS - 0.5mL                           Flucelvax (ccIIV4) 

- SDS - 0.5mL   Unknown         Completed                       [facility]

 

                    Boostrix (Tdap) Boostrix (Tdap) Unknown   Completed         

  [facility]

 

                                                    Afluria (IIV4) - 3 

years and older - 

SDS - 0.5mL                             Afluria (IIV4) - 3 

years and older - 

SDS - 0.5mL     Unknown         Completed                       [facility]

 

                                                    Flucelvax (ccIIV4) 

- MDV - 0.5mL                           Flucelvax (ccIIV4) 

- MDV - 0.5mL   Unknown         Completed                       [facility]

 

                                                    Flucelvax (ccIIV4) 

- SDS - 0.5mL                           Flucelvax (ccIIV4) 

- SDS - 0.5mL   Unknown         Completed                       [facility]

 

                    Boostrix (Tdap) Boostrix (Tdap) Unknown   Completed         

  [facility]







Vital Signs





                      Vital Name Observation Time Observation Value Comments   S

ource

 

                      height     2025 10:30:00 65.5 [in_i]            Comm

on [facility]

 

                      weight     2025 10:30:00 165.4 [lb_av]            Co

mmon [facility]

 

                      temperature 2025 10:30:00 98 [degF]             Comm

on [facility]

 

                      bmi        2025 10:30:00 27.1 kg/m2            Commo

n [facility]

 

                      oximetry   2025 10:30:00 99 %                  Commo

n [facility]

 

                      heart rate 2025 10:30:00 81 /min               Commo

n [facility]

 

                      respiratory rate 2025 10:30:00 17 /min              

 Common [facility]

 

                                                    blood pressure 

systolic        2025 10:30:00 110 mm[Hg]                      Common [facility]

 

                                                    blood pressure 

diastolic       2025 10:30:00 72 mm[Hg]                       Common [facility]

 

                      height     2024 15:00:00 65.5 [in_i]            Comm

on [facility]

 

                      weight     2024 15:00:00 168.6 [lb_av]            Co

mmon [facility]

 

                      temperature 2024 15:00:00 97.3 [degF]            Com

mon [facility]

 

                      bmi        2024 15:00:00 27.63 kg/m2            Comm

on [facility]

 

                      oximetry   2024 15:00:00 99 %                  Commo

n [facility]

 

                      respiratory rate 2024 15:00:00 16 /min              

 Common [facility]

 

                                                    blood pressure 

systolic        2024 15:00:00 112 mm[Hg]                      Common [facility]

 

                                                    blood pressure 

diastolic       2024 15:00:00 80 mm[Hg]                       [facility]

 

                      height     2024 08:30:00 65.5 [in_i]            Comm

on [facility]

 

                      weight     2024 08:30:00 144.6 [lb_av]            Co

mmon [facility]

 

                      temperature 2024 08:30:00 97.5 [degF]            Com

[facility]

 

                      bmi        2024 08:30:00 23.69 kg/m2            Comm

on [facility]

 

                      oximetry   2024 08:30:00 96 %                  Commo

n [facility]

 

                                                    blood pressure 

systolic        2024 08:30:00 110 mm[Hg]                      [facility]

[facility]

 

                                                    blood pressure 

diastolic       2024 08:30:00 70 mm[Hg]                       [facility]

[facility]

 

                      height     2023 08:20:00 65.5 [in_i]            Comm

on [facility]

 

                      weight     2023 08:20:00 153.3 [lb_av]            Co

[facility]

 

                      temperature 2023 08:20:00 97.7 [degF]            Com

[facility]

 

                      bmi        2023 08:20:00 25.12 kg/m2            Comm

on [facility]

 

                      oximetry   2023 08:20:00 99 %                  Commo

n [facility]

 

                      respiratory rate 2023 08:20:00 16 /min              

 [facility]

 

                                                    blood pressure 

systolic        2023 08:20:00 115 mm[Hg]                      [facility]

t [facility]

 

                                                    blood pressure 

diastolic       2023 08:20:00 62 mm[Hg]                       [facility]

[facility]

 

                      height     2023 11:00:00 65.5 [in_i]            Comm

on [facility]

 

                      weight     2023 11:00:00 146.4 [lb_av]            Co

mmon [facility]

 

                      temperature 2023 11:00:00 97.2 [degF]            Com

[facility]

 

                      bmi        2023 11:00:00 23.99 kg/m2            Comm

on [facility]

 

                      oximetry   2023 11:00:00 99 %                  Commo

n [facility]

 

                      respiratory rate 2023 11:00:00 18 /min              

 [facility]

 

                                                    blood pressure 

systolic        2023 11:00:00 118 mm[Hg]                      Common [facility]

t [facility]

 

                                                    blood pressure 

diastolic       2023 11:00:00 69 mm[Hg]                       [facility]

[facility]

 

                      height     2022 11:40:00 65.5 [in_i]            Comm

on [facility]

 

                      weight     2022 11:40:00 166 [lb_av]            Comm

on [facility]

 

                      temperature 2022 11:40:00 97.9 [degF]            Com

[facility]

 

                      bmi        2022 11:40:00 27.2 kg/m2            Commo

n [facility]

 

                      oximetry   2022 11:40:00 98 %                  Commo

n [facility]

 

                      respiratory rate 2022 11:40:00 16 /min              

 [facility]

 

                                                    blood pressure 

systolic        2022 11:40:00 122 mm[Hg]                      [facility]

 

                                                    blood pressure 

diastolic       2022 11:40:00 72 mm[Hg]                       [facility]

 

                      height     2022 10:00:00 66 [in_i]             Commo

n [facility]

 

                      weight     2022 10:00:00 191.4 [lb_av]            Co

mmon [facility]

 

                      temperature 2022 10:00:00 98.1 [degF]            Com

[facility]

 

                      bmi        2022 10:00:00 30.89 kg/m2            Comm

on [facility]

 

                      oximetry   2022 10:00:00 100 %                 Commo

n [facility]

 

                      respiratory rate 2022 10:00:00 18 /min              

 [facility]

 

                                                    blood pressure 

systolic        2022 10:00:00 121 mm[Hg]                      [facility]

[facility]

 

                                                    blood pressure 

diastolic       2022 10:00:00 71 mm[Hg]                       Common [facility]

 

                      height     2022 10:30:00 66 [in_i]             Commo

n [facility]

 

                      weight     2022 10:30:00 224.8 [lb_av]            Co

mmon [facility]

 

                      temperature 2022 10:30:00 97.9 [degF]            Com

mon [facility]

 

                      bmi        2022 10:30:00 36.28 kg/m2            Comm

on [facility]

 

                      oximetry   2022 10:30:00 100 %                 Commo

n [facility]

 

                      respiratory rate 2022 10:30:00 18 /min              

 [facility]

 

                                                    blood pressure 

systolic        2022 10:30:00 132 mm[Hg]                      Common [facility]

 

                                                    blood pressure 

diastolic       2022 10:30:00 83 mm[Hg]                       Common [facility]

 

                      height     2021-10-12 08:00:00                       Commo

n [facility]

 

                      weight     2021-10-12 08:00:00 207 [lb_av]            Comm

on [facility]

 

                      temperature 2021-10-12 08:00:00 97.5 [degF]            Com

[facility]

 

                      bmi        2021-10-12 08:00:00 33.41 kg/m2            Comm

on [facility]







Procedures





                                        Procedure           Date / Time 

Performed                 Performing Clinician      Source

 

                          [id number]      2024 00:00:00 WAI PLATT [facility]

 

                                                    RADEX SACRUM&COCCYX 

MINIMUM 2 VIEWS     2023 00:00:00                     [facility]

 

                          [id number]      2022 00:00:00 [email or web address]     [facility]

 

                          [id number]      2022 00:00:00 [email or web address]     [facility]

 

                          [id number]      2022 00:00:00 [email or web address]     [facility]

 

                          [id number]      2022 00:00:00 [email or web address]     [facility]

 

                                ASSIGNMENT OF BENEFITS 2021 01:46:17 Docto

r Unassigned, 

No Name                                 [facility]

 

                          Gastrointestinal Surgery                           [facility]

[facility] 

[facility]







Encounters





                                                    Start 

Date/Time                               End 

Date/Time                               Encounter 

Type                                    Admission 

Type                                    Attending 

Clinicians                              Care 

Facility                                Care 

Department                              Encounter 

ID                                      Source

 

                                                    2024 

09:37:00                        Outpatient                      Mccray, 

[facility]              [id number]

[id number]                                   [facility]

 

                                                    2024 

13:00:00                        Inpatient       Maria D Ott            [facility]               [facility]                [id number]

[id number]                                      [facility] 

[facility] 

[facility]

 

                                                    2022 

10:04:03                        Outpatient                      Mccray, 

[facility]              [facility]              [facility]              [id number]

[id number]                                   [facility]

 

                                                    2022 

14:01:41                        Outpatient                      Mccray, 

Zunilda              [facility]              [facility]              [id number]

[id number]                                   [facility]

 

                                                    2022 

13:58:46                        Outpatient                      Mccray, 

Zunilda              [facility]              [facility]              [id number]

[id number]                                   [facility]

 

                                                    2022 

13:55:58                        Outpatient                      Mccray, 

Zunilda              [facility]              [facility]              [id number]

[id number]                                   [facility]

 

                                                    2022 

12:58:59                        Outpatient                      Mccray, 

Zunilda              [facility]              [facility]              [id number]

[id number]                                   [facility]

 

                                                    2022 

12:52:26                        Outpatient                      Mccray, 

Zunilda              [facility]              [facility]              [id number]

[id number]                                   [facility]

 

                                                    2022 

12:47:02                        Outpatient                      Mccray, 

Zunilda              [facility]              [facility]              [id number]

[id number]                                   [facility]

 

                                                    2022 

12:28:14                        Outpatient                      Mccray, 

Zunilda              [facility]              [facility]              [id number]

[id number]                                   [facility]

 

                                                    2022 

12:00:48                        Outpatient                      Mccray, 

Zunilda              [facility]              [facility]              [id number]

[id number]                                   [facility]

 

                                                    2022 

11:57:18                        Outpatient                      Mccrya, 

Zunilda              [facility]              [facility]              [id number]

[id number]                                   [facility]

 

                                                    2022 

11:56:24                        Outpatient                      Mccray, 

Zunilda              [facility]              [facility]              [id number]

[id number]                                   [facility]

 

                                                    2025 

00:00:00                                2025 

00:00:00  (TEL)                         [facility]    [facility]    [id number]   [facility]

 

                                                    2025 

00:00:00                                2025 

00:00:00  (TEL)                         [facility]    [facility]    [id number]   [facility]

 

                                                    2025 

00:00:00                                2025 

00:00:00                                (WELLNESS) 

Wellness 

Visit                            [facility]     [facility]     [id number]    [facility]

 

                                                    2025 

00:00:00                                2025 

00:00:00  (TEL)                         [facility]    [facility]    [id number]   [facility]

 

                                                    2025 

00:00:00                                2025 

00:00:00  (TEL)                         [facility]    [facility]    [id number]   [facility]

 

                                                    2025 

00:00:00                                2025 

00:00:00  (TEL)                         [facility]    [facility]    [id number]   [facility]

 

                                                    2024 

00:00:00                                2024 

00:00:00  (TEL)                         [facility]    [facility]    [id number]   [facility]

 

                                                    2024 

00:00:00                                2024 

00:00:00                                OFFICE 

VISIT 

ESTAB PT 

LEVEL 4                          [facility]     [facility]     [id number]    [facility]

 

                                                    2024 

00:00:00                                2024 

00:00:00  (TEL)                         STLMLC    STLMLC    [id number]   [facility]

 

                                                    2024 

07:52:00                                2024 

15:41:00            Inpatient           Maria D Ott            [facility]               OBPP                [id number]

25                                      [facility] 

[facility]

[facility]

 

                                                    2024-10-30 

12:38:00                                2024-10-31 

00:00:00            Outpatient          Maria D Ott            [facility]               OBOP                [id number]

28                                      [facility] 

[facility]

[facility]

 

                                                    2024 

08:00:00                                2024 

08:00:00            Outpatient          Maria D Ott            [facility]               JINNY                [id number]

15                                      [facility] 

[facility]

[facility]

 

                                                    2024 

00:00:00                                2024 

00:00:00  (WEB)                         STLMLC    STLMLC    [id number]   [facility]

 

                                                    2024 

00:00:00                                2024 

00:00:00                                PREV VISIT 

EST AGE 

40-64                            STLMLC     STLMLC     [id number]    [facility]

 

                                                    2024 

00:00:00                                2024 

00:00:00  (TEL)                         STLMLC    STLMLC    [id number]   [facility]

 

                                                    2023 

00:00:00                                2023 

00:00:00  (TEL)                         STLMLC    STLMLC    [id number]   [facility]

 

                                                    2023 

00:00:00                                2023 

00:00:00                                OFFICE 

VISIT 

ESTAB PT 

LEVEL 3                          STLMLC     STLMLC     [id number]    [facility]

 

                                                    2023 

00:00:00                                2023 

00:00:00  (TEL)                         STLMLC    STLMLC    [id number]   [facility]

 

                                                    2023 

00:00:00                                2023 

00:00:00            Outpatient                              ZAMZAM_Taba_Ho

Carmen             AO                AO                [id number]

[id number]                                  [facility] 

[facility]

d[facility] 

[facility] 

[facility]icin

e

 

                                                    2023 

00:00:00                                2023 

00:00:00            Outpatient                              FOG_Taba_Ho

Carmen             AOSM                AOSM                [id number]

[id number]                                  [facility] 

[facility]

dic 

[facility] 

Medicin

e

 

                                                    2023 

00:00:00                                2023 

00:00:00                                Boo Gutiérrez MD: 

[address] [place] 

[address]

, Ph. 

[phone]                                      AOSM            [place] [facility] 

[place] [facility] 

FOG_Ofc 

[facility]               [id number]                  [facility] 

Orthope

dic 

Sports 

Medicin

e

 

                                                    2023 

00:00:00                                2023 

00:00:00            Outpatient                              FOG_Taba_Ho

Carmen             AOSM                AOSM                [id number]

[id number]                                  [facility] 

[facility]

dic 

Sports 

Medicin

e

 

                                                    2023 

00:00:00                                2023 

00:00:00            Outpatient                              FOG_Taba_Candelario Morales             AOSM                AO                [id number]

[id number]                                  [facility] 

[facility]

dic 

Sports 

Medicin

e

 

                                                    2023 

00:00:00                                2023 

00:00:00            Outpatient                              FOG_Taba_Ho

Carmen             AOSM                AOSM                [id number]

[id number]                                  [facility] 

[facility]

dic 

Sports 

Medicin

e

 

                                                    2023 

00:00:00                                2023 

00:00:00                                PREV VISIT 

EST AGE 

40-64                            STLMLC     STLMLC     [id number]    [facility]

 

                                                    2022 

00:00:00                                2022 

00:00:00                                OFFICE 

VISIT EST 

PT LEVEL 3                       STLMLC     STLMLC     [id number]    [facility] 

[facility] 

 [facility] 

[facility]

 

                                                    2022 

00:00:00                                2022 

00:00:00  (TEL)                         STLMLC    STLMLC    [id number]   [facility]

 

                                                    2022 

07:20:00                                2022 

07:20:00            Outpatient          Bola Herrera              [facility]               RAD                 [id number]

75                                      [facility]

 

                                                    2022 

10:57:00                                2022 

13:18:00   Emergency  Cortez Hurtado [facility]      CORRINA       [id number]

00                                      [facility]

 

                                                    2022 

10:57:00                                2022 

13:18:00   Emergency  Cortez Hurtado [facility]      [id number]

[id number]                                  [facility]

 

                                                    2022 

00:00:00                                2022 

00:00:00                                OFFICE 

VISIT EST 

PT LEVEL 3                       STLMLC     STLMLC     [id number]    [facility]

 

                                                    2022 

00:00:00                                2022 

00:00:00  (TEL)                         STLMLC    STLMLC    [id number]   [facility]

 

                                                    2022 

00:00:00                                2022 

00:00:00  (TEL)                         STLMLC    STLMLC    [id number]   [facility]

 

                                                    2022 

08:05:00                                2022 

09:48:00            Inpatient           Bola Herrera              [facility]               MEDI.01             [id number]

13                                      [facility]

 

                                                    2022 

00:00:00                                2022 

00:00:00  (TEL)                         STLMLC    STLMLC    [id number]   [facility]

 

                                                    2022 

00:00:00                                2022 

00:00:00                                PREV VISIT 

EST AGE 

40-64                            STLMLC     STLMLC     [id number]    [facility]

 

                                                    2022 

00:37:00                                2022 

00:37:00            Outpatient          Bola Herrera              [facility]               ENDO                [id number]

35                                      [facility]

 

                                                    2021-10-12 

00:00:00                                2021-10-12 

00:00:00                                OFFICE 

VISIT 

ESTAB PT 

LEVEL 4                          STLMLC     STLMLC     [id number]    [facility]

 

                                                    2021-10-02 

00:00:00                                2021-10-02 

00:00:00                                Letter 

(Out)                                               Gissell Wheeler                                 [facility]                                [id number]

[id number]

[id number]

[id number]

019                       [id number]                  [facility]

 

                                                    2021-10-01 

14:02:21                                2021-10-01 

14:17:21                                Laboratory 

Only                                                Only, Ang 

Db Test

[facility] 

Office 

[facility]                                1.2.840.114

[id number]

4.2.7.2.686

[phone]

370                       [id number]                  [facility]

 

                                                    2021-10-01 

14:15:00                                2021-10-01 

14:15:00            Outpatient          [facility]            [id number]      [facility]

 

                                                    2021 

20:48:44                                2021 

21:03:44                                Laboratory 

Only                                                Only, Ang 

Db Test

[facility] 

[facility] 

Office 

[facility]                                1.2.840.114

350.1.13.10

4.2.7.2.686

[phone]

370                       [id number]                  [facility]

 

                                                    2021 

20:40:00                                2021 

20:40:00            Outpatient          R                   PING VERGARA        [facility]            [id number]      [facility]

 

                                                    2021 

00:00:00                                2021 

00:00:00                                Orders 

Only                                                Doctor 

Unassigned, 

No Name                                 [facility]                                [id number]

[id number]

[id number]

[phone]

009                       [id number]                  [facility]

 

                                                    2021 

00:00:00                                2021 

00:00:00  Outpatient                     STLMLC    STLMLC    [id number]   [facility]

 

                                                    2021 

00:00:00                                2021 

00:00:00  Outpatient                     STLMLC    STLMLC    [id number]   [facility]

 

                                                    2021 

00:00:00                                2021 

00:00:00  Outpatient                     STLMLC    STLMLC    [id number]   [facility]

 

                                                    2021-04-15 

00:00:00                                2021-04-15 

00:00:00  Outpatient                     STLMLC    STLMLC    [id number]   [facility]

 

                                                    2021 

00:00:00                                2021 

00:00:00  Outpatient                     STLMLC    STLMLC    [id number]   [facility]

 

                                                    2021 

05:04:54                                2021 

05:04:54            Outpatient          Marta Kraft             [facility]               [id number]

[id number]                                      [facility]

 

                                                    2021 

00:00:00                                2021 

00:00:00  Outpatient                     STLMLC    STLMLC    [id number]   [facility]

 

                                                    2021 

00:00:00                                2021 

00:00:00  Outpatient                     STLMLC    STLMLC    [id number]   [facility]

 

                                                    2020 

00:00:00                                2020 

00:00:00  Outpatient                     STLMLC    STLMLC    [id number]   [facility]

 

                                                    2020-10-19 

00:00:00                                2020-10-19 

00:00:00  Outpatient                     STLMLC    STLMLC    [id number]   [facility]

 

                                                    2020 

12:00:00                                2020 

12:00:00            Outpatient          Marta Kraft             [facility]                [id number]

92                                      [facility]

 

                                                    2019 

12:00:00                                2019 

12:00:00            Outpatient          Marta Kraft             [facility]                [id number]

79                                      [facility]







Results





                    Test Description Test Time Test Comments Results   Result Co

mments Source







                                        

 

                                        

 

                                        

 

                                        

 

                                        

 

                                        

 

                                        

 

                                        

 

                                        

 

                                        

 

                                        

 

                                        

 

                                        

 

                                        

 

                                        

 

                                        

 

                                        

 

                                        

 

                                        

 

                                        

 

                                        

 

                                        

 

                                        

 

                                        

 

                                        

 

                                        

 

                                        

 

                                        

 

                                        

 

                                        

 

                                        

 

                                        

 

                                        

 

                                        

 

                                        

 

                                        

 

                                        

 

                                        

 

                                        

 

                                        

 

                                        

 

                                        

 

                                        

 

                                        

 

                                        

 

                                        

 

                                        

 

                                        

 

                                        





COMPREHENSIVE METABOLIC PANEL2024-12-10 00:00:00* 



                      Test Item  Value      Reference Range Interpretation Comme

nts

 

                                                    NUCLEATED RBCS (test 

code = 63039-1) 0.0 /100 WBC'S  See_Comment                     [Automated 

message] The 

system which 

generated this 

result transmitted 

reference range: 

0.0 /100 WBC'S. 

The reference 

range was not used 

to interpret this 

result as 

normal/abnormal.

 

                                                    ABSOLUTE EOSINOPHILS 

(test code = 58917-1) 0.26 K/UL       See_Comment                     [Automated

 

message] The 

system which 

generated this 

result transmitted 

reference range: 

0.00-0.50 K/UL. 

The reference 

range was not used 

to interpret this 

result as 

normal/abnormal.

 

                                                    ABSOLUTE LYMPHOCYTES 

(test code = 29117-7) 1.70 K/UL       See_Comment                     [Automated

 

message] The 

system which 

generated this 

result transmitted 

reference range: 

1.00-4.00 K/UL. 

The reference 

range was not used 

to interpret this 

result as 

normal/abnormal.

 

                                                    ABSOLUTE MONOCYTES 

(test code = 66888-6) 0.64 K/UL       See_Comment                     [Automated

 

message] The 

system which 

generated this 

result transmitted 

reference range: 

0.20-1.00 K/UL. 

The reference 

range was not used 

to interpret this 

result as 

normal/abnormal.

 

                                                    ABSOLUTE NEUTROPHILS 

(test code = 75358-8) 3.60 K/UL       See_Comment                     [Automated

 

message] The 

system which 

generated this 

result transmitted 

reference range: 

1.50-7.50 K/UL. 

The reference 

range was not used 

to interpret this 

result as 

normal/abnormal.

 

                                                    BASOPHILS (test code 

= 48865-5)      0.6 %                                           

 

                                                    EOSINOPHILS (test 

code = 33902-1) 4.2 %                                           

 

                                                    HEMATOCRIT (test code 

= 72970-1)      35.6 %          See_Comment                     [Automated 

message] The 

system which 

generated this 

result transmitted 

reference range: 

34.0-45.0 %. The 

reference range 

was not used to 

interpret this 

result as 

normal/abnormal.

 

                                                    HEMOGLOBIN (test code 

= 718-7)        10.7 G/DL       See_Comment     L               [Automated 

message] The 

system which 

generated this 

result transmitted 

reference range: 

11.5-15.5 G/DL. 

The reference 

range was not used 

to interpret this 

result as 

normal/abnormal.

 

                                                    LYMPHOCYTES (test 

code = 93638-3) 27.2 %                                          

 

                                                    MCH (test code = 

40652-2)        25.2 PG         See_Comment                     [Automated 

message] The 

system which 

generated this 

result transmitted 

reference range: 

25.0-33.0 PG. The 

reference range 

was not used to 

interpret this 

result as 

normal/abnormal.

 

                                                    MCHC (test code = 

28540-3)        30.1 G/DL       See_Comment     L               [Automated 

message] The 

system which 

generated this 

result transmitted 

reference range: 

31.0-36.0 G/DL. 

The reference 

range was not used 

to interpret this 

result as 

normal/abnormal.

 

                                                    MCV (test code = 

81173-4)        83.8 fL         See_Comment                     [Automated 

message] The 

system which 

generated this 

result transmitted 

reference range: 

80.0-99.0 fL. The 

reference range 

was not used to 

interpret this 

result as 

normal/abnormal.

 

                                                    MONOCYTES (test code 

= 26485-3)      10.2 %                                          

 

                                                    NEUTROPHILS (test 

code = 66589-9) 57.5 %                                          

 

                                                    PLATELET COUNT (test 

code = 60688-5) 291 K/UL        See_Comment                     [Automated 

message] The 

system which 

generated this 

result transmitted 

reference range: 

130-400 K/UL. The 

reference range 

was not used to 

interpret this 

result as 

normal/abnormal.

 

                                                    RBC (test code = 

66837-2)        4.25 M/UL       See_Comment                     [Automated 

message] The 

system which 

generated this 

result transmitted 

reference range: 

3.80-5.40 M/UL. 

The reference 

range was not used 

to interpret this 

result as 

normal/abnormal.

 

                                                    RDW (test code = 

13269-1)        16.0 %          See_Comment     H               [Automated 

message] The 

system which 

generated this 

result transmitted 

reference range: 

11.5-15.0 %. The 

reference range 

was not used to 

interpret this 

result as 

normal/abnormal.

 

                                                    WBC (test code = 

22816-7)        6.3 K/UL        See_Comment                     [Automated 

message] The 

system which 

generated this 

result transmitted 

reference range: 

3.5-11.0 K/UL. The 

reference range 

was not used to 

interpret this 

result as 

normal/abnormal.

 

                                                    HEMOGLOBIN A1c (test 

code = 4548-4)  4.8 %           See_Comment                     [Automated 

message] The 

system which 

generated this 

result transmitted 

reference range: 

4.2-5.6 %. The 

reference range 

was not used to 

interpret this 

result as 

normal/abnormal.

 

                                                    TSH REFLEX TO FREE T4 

(test code = 15099-4) 1.560 UIU/ML    See_Comment                     [Automated

 

message] The 

system which 

generated this 

result transmitted 

reference range: 

0.400-4.100 

UIU/ML. The 

reference range 

was not used to 

interpret this 

result as 

normal/abnormal.

 

                                                    APPEARANCE (test code 

= 5767-9)       CLEAR           CLEAR                           

 

                                                    BILIRUBIN (test code 

= 5770-3)       NEGATIVE        NEGATIVE                        

 

                                                    COLOR (test code = 

5778-6)         YELLOW          YELLOW-STRAW                    

 

                                                    GLUCOSE (test code = 

5792-7)         NEGATIVE        NEGATIVE                        

 

                                                    KETONES (test code = 

5797-6)         NEGATIVE        NEGATIVE                        

 

                                                    LEUKOCYTE ESTERASE 

(test code = 5799-2) NEGATIVE        NEGATIVE                        

 

                                                    NITRITE (test code = 

5802-4)         NEGATIVE        NEGATIVE                        

 

                                                    OCCULT BLOOD (test 

code = 19755-8) NEGATIVE        NEGATIVE                        

 

                                                    pH (test code = 

5803-2)         7.5             5.0-9.0                         

 

                                                    PROTEIN (test code = 

65505-4)        NEGATIVE        NEGATIVE                        

 

                                                    SPECIFIC GRAVITY 

(test code = 5811-5) 1.008           1.005-1.035                     

 

                                                    UROBILINOGEN (test 

code = 65556-5) 0.2 MG/DL       See_Comment                     [Automated 

message] The 

system which 

generated this 

result transmitted 

reference range: 

<=2.0 MG/DL. The 

reference range 

was not used to 

interpret this 

result as 

normal/abnormal.

 

                                                    CALC LDL CHOL (test 

code = 58872-9) 115 MG/DL       See_Comment     H               [Automated 

message] The 

system which 

generated this 

result transmitted 

reference range: 

<100 MG/DL. The 

reference range 

was not used to 

interpret this 

result as 

normal/abnormal.

 

                                                    CHOLESTEROL (test 

code = 2093-3)  213 MG/DL       See_Comment     H               [Automated 

message] The 

system which 

generated this 

result transmitted 

reference range: 

<200 MG/DL. The 

reference range 

was not used to 

interpret this 

result as 

normal/abnormal.

 

                                                    HDL CHOLESTEROL (test 

code = 2085-9)  77 MG/DL        See_Comment                     [Automated 

message] The 

system which 

generated this 

result transmitted 

reference range: 

>39 MG/DL. The 

reference range 

was not used to 

interpret this 

result as 

normal/abnormal.

 

                                                    RISK RATIO LDL/HDL 

(test code = 14191-2) 1.49 RATIO      See_Comment                     [Automated

 

message] The 

system which 

generated this 

result transmitted 

reference range: 

<3.22 RATIO. The 

reference range 

was not used to 

interpret this 

result as 

normal/abnormal.

 

                                                    TRIGLYCERIDES (test 

code = 2571-8)  107 MG/DL       See_Comment                     [Automated 

message] The 

system which 

generated this 

result transmitted 

reference range: 

<150 MG/DL. The 

reference range 

was not used to 

interpret this 

result as 

normal/abnormal.

 

                                                    ALBUMIN (test code = 

1751-7)         4.1 G/DL        See_Comment                     [Automated 

message] The 

system which 

generated this 

result transmitted 

reference range: 

3.5-5.2 G/DL. The 

reference range 

was not used to 

interpret this 

result as 

normal/abnormal.

 

                                                    ALKALINE PHOSPHATASE 

(test code = 6768-6) 77 U/L          See_Comment                     [Automated 

message] The 

system which 

generated this 

result transmitted 

reference range: 

 U/L. The 

reference range 

was not used to 

interpret this 

result as 

normal/abnormal.

 

                                                    BILIRUBIN, TOTAL 

(test code = 1975-2) 0.3 MG/DL       See_Comment                     [Automated 

message] The 

system which 

generated this 

result transmitted 

reference range: 

<=1.2 MG/DL. The 

reference range 

was not used to 

interpret this 

result as 

normal/abnormal.

 

                                                    BUN (test code = 

3094-0)         7 MG/DL         See_Comment                     [Automated 

message] The 

system which 

generated this 

result transmitted 

reference range: 

6-20 MG/DL. The 

reference range 

was not used to 

interpret this 

result as 

normal/abnormal.

 

                                                    CALCIUM (test code = 

13196-4)        9.6 MG/DL       See_Comment                     [Automated 

message] The 

system which 

generated this 

result transmitted 

reference range: 

8.5-10.5 MG/DL. 

The reference 

range was not used 

to interpret this 

result as 

normal/abnormal.

 

                                                    CALC A/G RATIO (test 

code = 1759-0)  1.8 RATIO       See_Comment                     [Automated 

message] The 

system which 

generated this 

result transmitted 

reference range: 

1.0-2.6 RATIO. The 

reference range 

was not used to 

interpret this 

result as 

normal/abnormal.

 

                                                    CALC BUN/CREAT (test 

code = 3097-3)  8 RATIO         See_Comment                     [Automated 

message] The 

system which 

generated this 

result transmitted 

reference range: 

6-28 RATIO. The 

reference range 

was not used to 

interpret this 

result as 

normal/abnormal.

 

                                                    CALC GLOBULIN (test 

code = 25573-1) 2.3 G/DL        See_Comment                     [Automated 

message] The 

system which 

generated this 

result transmitted 

reference range: 

1.9-3.7 G/DL. The 

reference range 

was not used to 

interpret this 

result as 

normal/abnormal.

 

                                                    CARBON DIOXIDE (test 

code = 1963-8)  29 MEQ/L        See_Comment                     [Automated 

message] The 

system which 

generated this 

result transmitted 

reference range: 

19-31 MEQ/L. The 

reference range 

was not used to 

interpret this 

result as 

normal/abnormal.

 

                                                    CHLORIDE (test code = 

2075-0)         106 MEQ/L       See_Comment                     [Automated 

message] The 

system which 

generated this 

result transmitted 

reference range: 

 MEQ/L. The 

reference range 

was not used to 

interpret this 

result as 

normal/abnormal.

 

                                                    CREATININE (test code 

= 2160-0)       0.93 MG/DL      See_Comment                     [Automated 

message] The 

system which 

generated this 

result transmitted 

reference range: 

0.60-1.30 MG/DL. 

The reference 

range was not used 

to interpret this 

result as 

normal/abnormal.

 

                                                    eGFR ( CKD-EPI) 

(test code = 48642-3) 78 ML/MIN/1.73  See_Comment                     [Automated

 

message] The 

system which 

generated this 

result transmitted 

reference range: 

>60 ML/MIN/1.73. 

The reference 

range was not used 

to interpret this 

result as 

normal/abnormal.

 

                                                    GLUCOSE (test code = 

1558-6)         80 MG/DL        See_Comment                     [Automated 

message] The 

system which 

generated this 

result transmitted 

reference range: 

70-99 MG/DL. The 

reference range 

was not used to 

interpret this 

result as 

normal/abnormal.

 

                                                    POTASSIUM (test code 

= 2823-3)       4.1 MEQ/L       See_Comment                     [Automated 

message] The 

system which 

generated this 

result transmitted 

reference range: 

3.5-5.4 MEQ/L. The 

reference range 

was not used to 

interpret this 

result as 

normal/abnormal.

 

                                                    PROTEIN, TOTAL (test 

code = 2885-2)  6.4 G/DL        See_Comment                     [Automated 

message] The 

system which 

generated this 

result transmitted 

reference range: 

6.1-8.3 G/DL. The 

reference range 

was not used to 

interpret this 

result as 

normal/abnormal.

 

                                                    AST (test code = 

1920-8)         20 U/L          See_Comment                     [Automated 

message] The 

system which 

generated this 

result transmitted 

reference range: 

9-40 U/L. The 

reference range 

was not used to 

interpret this 

result as 

normal/abnormal.

 

                                                    ALT (test code = 

1742-6)         17 U/L          See_Comment                     [Automated 

message] The 

system which 

generated this 

result transmitted 

reference range: 

5-40 U/L. The 

reference range 

was not used to 

interpret this 

result as 

normal/abnormal.

 

                                                    SODIUM (test code = 

2951-2)         144 MEQ/L       See_Comment                     [Automated 

message] The 

system which 

generated this 

result transmitted 

reference range: 

133-146 MEQ/L. The 

reference range 

was not used to 

interpret this 

result as 

normal/abnormal.





[id number] 15:06:00* 



                      Test Item  Value      Reference Range Interpretation Comme

nts

 

                                                    SURGICAL (test 

code = SR)                              -------------------------------

-------------------------------

------------------------------R

UN DATE: 24 [facility] PAGE 1 RUN TIME: 

1506 Specimen Inquiry  RUN 

USER: INTERFACE 

-------------------------------

-------------------------------

------------------------------P

ATIENT: JORDYN BENNETT 

ACCT #: [id number] LOC: 

[facility] U #: [id number] AGE/SX: 

43/F ROOM: [facility] RE24REG DR: 

Maria D Rodriguez MD  : 

[birth year] BED: A DIS: STATUS: 

ADM IN TLOC: 

-------------------------------

-------------------------------

------------------------------ 

SPEC #: [id number] RECD: 

 STATUS: KENAN POPE 

#: [id number] YA: 6 

[facility] DR: Maria D Rodriguez MD  

ENTERED:  SP TYPE: 

SURGICAL OTHR DR: ORDERED: 

ANATOMIC SPEC, SPEC TRACK, 

12657 PROCEDURES: 67879 

() TISSUES: A. 

PLACENTA, THIRD TRIMESTER (28 + 

WEEKS) - PLACENTA FINAL 

DIAGNOSIS PLACENTA, DELIVERY: 

Membranes - No significant 

pathologic alteration. 

Cotyledons - Pattern consistent 

with gestational age. - Focal 

increased fibrin deposition. 

Umbilical cord - Three vessels. 

GROSS DESCRIPTION Fixative: 

FormalinLabeled: 

PlacentaSpecimen received: Is a 

link placenta almost round 

placental plate with 

attachedmembranes and umbilical 

cord.Trimmed weight: 405 

gDimensions: 17.5 x 16 x 2.5 

cmFetal membranes: Are 

tan-grey, semi translucent 

marginally inserted.Site of 

membrane rupture: 3 cm from the 

placental marginFetal surface: 

Is blue-gray, smooth, 

glistening exhibit normal 

vasculatureUmbilical cord: 3 

vessels, 35 cm in length and 

ranges in diameter from 0.7 to 

1.0 cmUmbilical cord insertion: 

Eccentric; 5.5 cm from closest 

placental margin.Umbilical cord 

appearance: Tan-white with 2 

twists per 10 cmMaternal 

surface: Shows Complete, 

well-formed and intact 

cotyledonsCut surface: Reveals 

red, spongy parenchyma with no 

discrete lesions 

identifiedAbnormalities: None 

identified Representative 

sections submitted as 

follows:A1 umbilical cord and 

membrane rollA2-A3 

full-thickness placental 

cross-section, divided into 

fetal and maternal aspectsA4 

additional section of maternal 

surfaceAS/2024 Technical 

component performed at [facility] [place] [id number] 

Immunohistochemical stains and 

Special Stains are performed at 

[facility], ** 

CONTINUED ON NEXT PAGE ** 

-------------------------------

-------------------------------

------------------------------R

UN DATE: 24 [facility] PAGE 2 RUN TIME: 

1506 Specimen Inquiry RUN USER: 

INTERFACE  

-------------------------------

-------------------------------

------------------------------S

PEC #: [id number] PATIENT: 

JORDYN BENNETT 

#[id number] 

(Continued)--------------------

-------------------------------

-------------------------------

---------- GROSS DESCRIPTION 

(Continued) [address] [facility], [address], [place] [address] Unless 

gross only, the diagnosis is 

based upon microscopic 

examination. 

Immunohistochemistry: This test 

was developed and its 

performance 

characteristicsdetermined by 

this laboratory. It has not 

been approved nor does it need 

approval by [employer] FDA. 

Appropriate positive and 

negative controls are reviewed 

and judged to beacceptable. 

This laboratory is certified 

under the Clinical Laboratory 

ImprovementAmendments (CLIA-88) 

as qualified to perform high 

complexity clinical laboratory 

testing. MICROSCOPIC 

DESCRIPTION Microscopic 

examination is performed and 

the findings are incorporated 

in the diagnosis. CLINICAL 

INFORMATION 24, OOB:1225, 

TIF:7029, PLACENTA PREVIA 36.2 

IUP.---------------------------

-------------------------------

-------------------------------

--- Signed 

_______________________________

_ Angelica King 24 1506 

-------------------------------

-------------------------------

------------------------------ 

** END OF REPORT **                                         





HGB [id number] 08:20:00* 



                      Test Item  Value      Reference Range Interpretation Comme

nts

 

                      HEMOGLOBIN (test code = HGB) 8.3 g/dL   10.1-13.8  L      

    

 

                      HEMATOCRIT (test code = HCT) 27.5 %     32.5-41.8  L      

    





AG HEPATITIS B [id number] 19:59:00* 



                      Test Item  Value      Reference Range Interpretation Comme

nts

 

                                                    AG HEPATITIS B SURFACE (test

 

code = HBSAG)   NON REACTIVE    NONREACTIVE                     





AB HEPATITIS C [id number] 19:59:00* 



                      Test Item  Value      Reference Range Interpretation Comme

nts

 

                                                    AB HEPATITIS C (test code = 

HCVAB)          NONREACTIVE     NONREACTIVE                     

 

                                                    SIGNAL TO CUTOFF (test code 

= 

CUTOFF)         0.12            <0.80           N               





AB [id number] 19:59:00* 



                      Test Item  Value      Reference Range Interpretation Comme

nts

 

                      AB TREPONEMA (test code = TREPAB) NONREACTIVE NONREACTIVE 

           





AB HIV 1  19:59:00* 



                      Test Item  Value      Reference Range Interpretation Comme

nts

 

                                                    AB HIV 1 2 (test 

code = AOC19YP) NONREACTIVE     NONREACTIVE                     Done by [employer] 

4th Gen HIV Ag/Ab Combo 

Screen





COMPREHENSIVE METABOLIC [id number] 17:26:00* 



                      Test Item  Value      Reference Range Interpretation Comme

nts

 

                                                    SODIUM (test code = 

NA)             138 mEq/L       136-145         N               

 

                                                    POTASSIUM (test 

code = K)       3.7 mEQ/L       3.4-4.5         N               Please note new 

normal 

range as of May 2024

 

                                                    CHLORIDE (test code 

= CL)           107 mEq/L                 N               Please note new 

normal 

range as of May 2024

 

                                                    CARBON DIOXIDE 

(test code = CO2) 22 mEq/L        22-31           N               

 

                                                    ANION GAP (test 

code = GAP)     12.7            10-20           N               

 

                                                    GLUCOSE (test code 

= GLU)          94 mg/dL                  N               Please note new 

normal 

range as of May 2024

 

                                                    BLOOD UREA NITROGEN 

(test code = BUN) 9 mg/dL         8-23            N               Please note ne

w normal 

range as of May 2024

 

                                                    CREATININE (test 

code = CREAT)   0.7 mg/dL       0.55-1.02       N               Please note new 

normal 

range as of May 2024

 

                                                    TOTAL PROTEIN (test 

code = PROT)    6.4 g/dL        5.7-8.2         N               Please note new 

normal 

range as of May 2024

 

                                                    ALBUMIN (test code 

= ALB)          4.1 g/dL        3.2-4.8         N               Please note new 

normal 

range as of May 2024

 

                                                    CALCIUM (test code 

= CA)           9.2 mg/dL       8.3-10.6        N               Please note new 

normal 

range as of May 2024

 

                                                    BILIRUBIN TOTAL 

(test code = BILT) 0.5 mg/dL       0.3-1.2         N               Please note n

ew normal 

range as of May 2024

 

                                                    SGOT/AST (test code 

= AST)          24 units/L      < 34                            

 

                                                    SGPT/ALT (test code 

= ALT)          13 units/L      10-49           N               

 

                                                    ALKALINE 

PHOSPHATASE TOTAL 

(test code = ALKP) 115 units/L               N               Please note n

ew normal 

range as of May 2024

 

                                                    GLOMERULAR 

FILTRATION RATE 

(test code = GFR) 110.67 ml/min   >60             N               The Glomerular

 

Filtration Rate is a 

calculated 

parameterbased on 

serum Creatinine, 

patient age and sex. 

GFR valuesless than 60 

mL/min/1.73 square 

meters are indicative 

ofChronic Kidney 

Disease. Values less 

than 15 

mL/min/1.73square 

meters indicate Kidney 

failure. The 

calculation forGFR is 

based on the CKD-EPI 

() calculation. 

This formulais race 

indifferent and is the 

recommended formula 

for GFRby the National 

Kidney Foundation for 

Adults.The GFR will 

not calculate if the 

sex is unknown or if 

thepatient's age is 

<18 years.





CBC W/AUTO [id number] 16:55:00* 



                      Test Item  Value      Reference Range Interpretation Comme

nts

 

                      WHITE BLOOD CELL (test code = WBC) 8.4 K/mm3  6.5-12.3   N

          

 

                      RED BLOOD CELL (test code = RBC) 4.14 M/mm3 3.51-4.69  N  

        

 

                      HEMOGLOBIN (test code = HGB) 10.9 g/dL  10.1-13.8  N      

    

 

                      HEMATOCRIT (test code = HCT) 34.8 %     32.5-41.8  N      

    

 

                      MEAN CELL VOLUME (test code = MCV) 84.1 fL    84.6-96.6  L

          

 

                      MEAN CELL HGB (test code = MCH) 26.3 pg    27.3-33.9  L   

       

 

                                                    MEAN CELL HGB CONCETRATION (

test 

code = MCHC)    31.3 gm/dL      32.0-34.2       L               

 

                                                    RED CELL DISTRIBUTION WIDTH 

(test 

code = RDW)     19.9 %          12.2-16.3       H               

 

                      PLATELET COUNT (test code = PLT) 180 K/mm3  134-363    N  

        

 

                                                    MEAN PLATELET VOLUME (test c

ode = 

MPV)            11.8 fL         9.2-12.7        N               

 

                      NEUTROPHIL % (test code = NT%) 69.6 %     57.9-77.3  N    

      

 

                      LYMPHOCYTE % (test code = LY%) 18.7 %     14.5-29.7  N    

      

 

                      MONOCYTE % (test code = MO%) 9.1 %      3.6-10.2   N      

    

 

                      EOSINOPHIL % (test code = EO%) 1.4 %      0.0-3.0    N    

      

 

                      BASOPHIL % (test code = BA%) 0.2 %      0.1-0.9    N      

    

 

                      NEUTROPHIL # (test code = NT#) 5.8 K/mm3                  

      

 

                      LYMPHOCYTE # (test code = LY#) 1.6 K/mm3                  

      

 

                      MONOCYTE # (test code = MO#) 0.8 K/mm3                    

    

 

                      EOSINOPHIL # (test code = EO#) 0.12 K/mm3                 

      

 

                      BASOPHIL # (test code = BA#) 0.0 K/mm3                    

    





CBC W/AUTO [id number] 00:00:00* 



                      Test Item  Value      Reference Range Interpretation Comme

nts

 

                                                    NUCLEATED RBCS (test 

code = 92305-3) 0.0 /100 WBC'S  See_Comment                     [Automated messa

ge] 

The system which 

generated this 

result transmitted 

reference range: 0.0 

/100 WBC'S. The 

reference range was 

not used to 

interpret this 

result as 

normal/abnormal.

 

                                                    ABSOLUTE EOSINOPHILS 

(test code = 

10315-1)        0.22 K/UL       See_Comment                     [Automated messa

ge] 

The system which 

generated this 

result transmitted 

reference range: 

0.00-0.50 K/UL. The 

reference range was 

not used to 

interpret this 

result as 

normal/abnormal.

 

                                                    ABSOLUTE LYMPHOCYTES 

(test code = 

09900-9)        1.64 K/UL       See_Comment                     [Automated messa

ge] 

The system which 

generated this 

result transmitted 

reference range: 

1.00-4.00 K/UL. The 

reference range was 

not used to 

interpret this 

result as 

normal/abnormal.

 

                                                    ABSOLUTE MONOCYTES 

(test code = 

37234-2)        0.48 K/UL       See_Comment                     [Automated messa

ge] 

The system which 

generated this 

result transmitted 

reference range: 

0.20-1.00 K/UL. The 

reference range was 

not used to 

interpret this 

result as 

normal/abnormal.

 

                                                    ABSOLUTE NEUTROPHILS 

(test code = 

00898-4)        2.25 K/UL       See_Comment                     [Automated messa

ge] 

The system which 

generated this 

result transmitted 

reference range: 

1.50-7.50 K/UL. The 

reference range was 

not used to 

interpret this 

result as 

normal/abnormal.

 

                                                    BASOPHILS (test code 

= 63415-5)      0.9 %                                           

 

                                                    EOSINOPHILS (test 

code = 79170-1) 4.7 %                                           

 

                                                    HEMATOCRIT (test 

code = 54601-6) 42.1 %          See_Comment                     [Automated messa

ge] 

The system which 

generated this 

result transmitted 

reference range: 

34.0-45.0 %. The 

reference range was 

not used to 

interpret this 

result as 

normal/abnormal.

 

                                                    HEMOGLOBIN (test 

code = 718-7)   13.9 G/DL       See_Comment                     [Automated messa

ge] 

The system which 

generated this 

result transmitted 

reference range: 

11.5-15.5 G/DL. The 

reference range was 

not used to 

interpret this 

result as 

normal/abnormal.

 

                                                    LYMPHOCYTES (test 

code = 74613-2) 35.3 %                                          

 

                                                    MCH (test code = 

02366-8)        29.1 PG         See_Comment                     [Automated messa

ge] 

The system which 

generated this 

result transmitted 

reference range: 

25.0-33.0 PG. The 

reference range was 

not used to 

interpret this 

result as 

normal/abnormal.

 

                                                    MCHC (test code = 

28540-3)        33.0 G/DL       See_Comment                     [Automated messa

ge] 

The system which 

generated this 

result transmitted 

reference range: 

31.0-36.0 G/DL. The 

reference range was 

not used to 

interpret this 

result as 

normal/abnormal.

 

                                                    MCV (test code = 

97039-5)        88.1 fL         See_Comment                     [Automated messa

ge] 

The system which 

generated this 

result transmitted 

reference range: 

80.0-99.0 fL. The 

reference range was 

not used to 

interpret this 

result as 

normal/abnormal.

 

                                                    MONOCYTES (test code 

= 26485-3)      10.3 %                                          

 

                                                    NEUTROPHILS (test 

code = 27163-1) 48.6 %                                          

 

                                                    PLATELET COUNT (test 

code = 74883-1) 248 K/UL        See_Comment                     [Automated messa

ge] 

The system which 

generated this 

result transmitted 

reference range: 

130-400 K/UL. The 

reference range was 

not used to 

interpret this 

result as 

normal/abnormal.

 

                                                    RBC (test code = 

06325-9)        4.78 M/UL       See_Comment                     [Automated messa

ge] 

The system which 

generated this 

result transmitted 

reference range: 

3.80-5.40 M/UL. The 

reference range was 

not used to 

interpret this 

result as 

normal/abnormal.

 

                                                    RDW (test code = 

56068-0)        12.2 %          See_Comment                     [Automated messa

Selenokhod] 

The system which 

generated this 

result transmitted 

reference range: 

11.5-15.0 %. The 

reference range was 

not used to 

interpret this 

result as 

normal/abnormal.

 

                                                    WBC (test code = 

79301-9)        4.6 K/UL        See_Comment                     [Automated messa

ge] 

The system which 

generated this 

result transmitted 

reference range: 

3.5-11.0 K/UL. The 

reference range was 

not used to 

interpret this 

result as 

normal/abnormal.





[id number] 00:00:00* 



                      Test Item  Value      Reference Range Interpretation Comme

nts

 

                      ESTRADIOL (test code = 2243-4) <17.0 PG/ML SEE BELOW PG/ML

            





- XR UGI W/AIR [id number] 12:10:00
********************************[facility] 1980 Sex: 
F************************************************************Patient Name: 
JORDYN BENNETT Unit No: [id number] EXAMS: CPT CODE: [id number] XR UGI W/AIR
W/KUB 54514  Single contrast upper GI 7 views 2022 CLINICAL INDICATION: 
Gastroesophageal reflux LOCATION: W1 REPORTED FLUOROSCOPY TIME: 75 seconds 
FINDINGS: Fluoroscopic imaging during oral administration of single contrast 
thin liquid barium revealed contrast passing into the stomach and small bowel 
with no evidence for leak. There was no esophageal dilation, stricture or 
dysmotility. No reflux or hiatal hernia was visualized. IMPRESSION: Unremarkable
postoperative examination. ** Electronically Signed by TIMOTHY SEIPEL M.D. ** **
on 2022 at 1210 ** Reported and signed by: TIMOTHYSEIPEL M.D. CC: Bola Minaya MD  Technologist: Yandel Cope Time: DAP (Gy m2): Air Kerma (mGy): 
Trscr Dt/Tm: 2022 (1210) by:[email or web address] Printed Date/Time: 2022 
(1213) Name: JORDYN BENNETT [facility] Phys: Bola Lauren MD [address] : 1980 Age: 41 Sex: [place], [place] 
[address] Acct No: [id number] Loc: P.RAD Exam Date: 2022 Status: REG CLI PH:
FAX: PAGE 1 Signed Report- XR SACRUM/COCCYX 2 + -08-75 12:13:00
********************************[facility] : 1980 Sex: 
F************************************************************Patient Name: 
JORDYN BENNETT Unit No: [id number]  EXAMS: CPT CODE: [id number] XR SACRUM/CO
CCYX 2 + V 25973 EXAM: Sacrum and coccyx 2 views  Location code: A1 HISTORY: 
Fracture COMPARISON: None available FINDINGS: There is no acute fracture or 
dislocation. Fine osseous detail of the sacrumis obscured by overlying bowel gas
and stool. Sacroiliac joints are within normal limits. Sacral arcs are intact. 
IMPRESSION:  No acute osseous abnormality. ** Electronically Signed by CYNTHIA BACA M.D.on 2022 at 1213 ** Reported and signed by: CYNTHIA BACA M.D.  CC: Cortez Pickard MD Technologist: Talat Cope Time: DAP (Gy m2): Air Kerma (mGy): 
Trscr Dt/Tm: 2022 (7033) by:[email or web address] Printed Date/Time: 2022 (6047)
Name: JORDYN BENNETT [facility] Phys: Cortez Mcbride MD [address] : 1980 Age: 41 Sex: [place] [address] Acct No: 
[id number] Loc: P.ERS Exam Date: 2022 Status: REG ER PH:  FAX: PAGE 1 
Signed Report- XR CHEST 1 -98-29 12:13:00
************************************************************[facility] : 1980 Sex: 
F************************************************************Patient Name: 
JORDYN BENNETT Unit No: [id number] EXAMS: CPT CODE: [id number] XR CHEST 1 V
12633 EXAM: Chest one view. Location:  HISTORY: Chest Pain, syncope, fall  
COMPARISON: None available. FINDINGS: The lungs are free of focal airspace 
consolidations.. There are no pleural effusionsor pneumothorax. The aorta, 
pulmonary vasculature and mediastinum are within normal limits. Cardiac
silhouette is normal in size and contour.  Visualized skeletal structures are 
unremarkable. IMPRESSION: No active disease in the chest. ** Electronically 
Signed by CYNTHIA BACA M.D. on 2022 at 1213 ** Reported and signed by: CYNTHIA BACA M.D. CC: Cortez Pickard MD  Technologist: Hayder Ortiz Fluoro Time: DAP (Gy 
m2): Air Kerma (mGy): Trscr Dt/Tm: 2022 (1213) by:[email or web address] Printed 
Date/Time: 2022 (5342) Name: GALOJORDYN SMITHA [facility] Phys:
Cortez Mcbride MD [address] : 1980 Age: 41 Sex: [place], 
[place] [address] Acct No: [id number] Loc: P.ERS  Exam Date: 2022 Status: REG ER 
PH: FAX: PAGE 1 Signed AdpjggIMKDAIKJ--06-14 12:09:00* 



                      Test Item  Value      Reference Range Interpretation Comme

nts

 

                                                    TROPONIN-I (test 

code = TROPI)   < 2.5 pg/mL     27.36-66.23     L               Please note: Uni

ts and 

Reference Range have 

changedFeb 3, 2021





HCG SERUM [id number] 11:52:00* 



                      Test Item  Value      Reference Range Interpretation Comme

nts

 

                      HCG SERUM QUAL (test code = HCGQL) NEGATIVE   NEGATIVE    

          





DATE OF LAST MENSTRUAL PERIOD: 22BASIC METABOLIC [id number] 11:51:00
  * 



                      Test Item  Value      Reference Range Interpretation Comme

nts

 

                                                    SODIUM (test code 

= NA)           139 mmol/L      136-145         N               Please note: New

 

Reference Range 2021

 

                                                    POTASSIUM (test 

code = K)       4.0 mmol/L      3.5-5.1         N               

 

                                                    CHLORIDE (test 

code = CL)      106 mmol/L                N               Please note: New

 

Reference Range 2021

 

                                                    CARBON DIOXIDE 

(test code = CO2) 25 mmol/L       20-31           N               Please note: N

ew 

Reference Range 2021

 

                                                    GLUCOSE (test code 

= GLU)          80 mg/dL                  N               Please note: New

 

Reference Range 2021

 

                                                    BLOOD UREA 

NITROGEN (test 

code = BUN)     8 mg/dL         9-23            L               Please note: New

 

Reference Range 2021

 

                                                    GLOMERULAR 

FILTRATION RATE 

(test code = GFR)                       >=60 max 

estimate mL/min     >60                                     Units are 

mL/min/1.73m2 The 

estimated glomerular 

filtration rate is 

computed 

usingpatient race, 

age (>18), sex, and 

serum creatinine. If 

anyof the needed 

data elements are 

missing the 

Laboratory cannot 

compute an 

estimation of the 

glomerular 

filtration rate.

 

                                                    CREATININE (test 

code = CREAT)   0.80 mg/dL      0.55-1.02       N               Please note: New

 

Reference Range 2021

 

                                                    CALCIUM (test code 

= CA)           9.2 mg/dL       8.7-10.4        N               Please note: New

 

Reference Range 2021





- CT HEAD/BRAIN W/O [id number] 11:44:00
************************************************************[facility] : 1980 Sex: 
F************************************************************Patient Name: 
JORDYN BENNETT Unit No: [id number] EXAMS: CPT CODE: [id number] CT HEAD/BRAIN
W/O CONT 19649 EXAM: CT Head without contrast Location: A1 HISTORY: Syncope, 
fall COMPARISON: Noneavailable. TECHNIQUE: Multiple transaxial images of the 
brain were obtained without intravenous contrast. Images were reformatted to 
create coronal and sagittal reconstructions. One or more of the following dose 
reduction techniques were used: Automated exposure control, adjustment of the mA
and/orkV according to patient size, and/or utilization of iterative 
reconstruction technique. DLP: 797 mGy-cm.  FINDINGS: There is no acute 
intracranial hemorrhage. There is no mass, mass effect, midline shift or extra-
axial fluid collection.  Brain parenchymal volume and ventricular caliber are 
within normal limits. Gray-white differentiation is maintained. There is no 
evidence for acute major vessel infarct. Paranasal sinuses, mastoid air cells 
and visualized orbital contents are within normal limits. Osseous structures are
within normal limits.  IMPRESSION: No acute intracranial abnormality. No acute 
hemorrhage, mass lesion or infarct. ** Electronically Signed by CYNTHIA BACA M.D. 
on 2022 at 1144 ** Reported and signed by: CYNTHIA BACA M.D. CC: Cortez Pickard MD  Technologist: Martir Crump; Jan Landon; Sumaya Chowdary CTDI: 42.07 DLP: 797 
Trs Dt/Tm: 2022 (1144) by:Leah  Printed Date/Time: 2022 
(1142) Name: JORDYN BENNETT [facility] Phys: Cortez Mcbride MD[address]  : 1980 Age: 41 Sex: [place] [address] Acct No: 
[id number] Loc: P.ERS Exam Date: 2022 Status: REG ER PH: FAX: PAGE 1 
Signed ReportCBC W/AUTO [id number] 11:28:00* 



                      Test Item  Value      Reference Range Interpretation Comme

nts

 

                                                    WHITE BLOOD CELL (test code 

= 

WBC)            6.9 x10 3/uL    4.8-10.8        N               

 

                                                    RED BLOOD CELL (test code = 

RBC)            4.94 x10 6/uL   4.20-5.40       N               

 

                      HEMOGLOBIN (test code = HGB) 14.8 g/dL  12.0-16.0  N      

    

 

                      HEMATOCRIT (test code = HCT) 43.6 %     37.0-47.0  N      

    

 

                                                    MEAN CELL VOLUME (test code 

= 

MCV)            88.3 fL         81.0-99.0       N               

 

                      MEAN CELL HGB (test code = MCH) 30.0 pg    27-31      N   

       

 

                                                    MEAN CELL HGB CONCENTRATION 

(test code = MCHC) 33.9 G/DL       33-36.5         N               

 

                                                    RED CELL DISTRIBUTION WIDTH 

(test code = RDW) 13.4 %          12.9-16.9       N               

 

                                                    PLATELET COUNT (test code = 

PLT)            267 x10 3/uL    150-440         N               

 

                                                    MEAN PLATELET VOLUME (test c

ode 

= MPV)          11.8 fL         8.9-12.4        N               

 

                      NEUTROPHIL % (test code = NT%) 60.8 %     42.2-75.2  N    

      

 

                      LYMPHOCYTE % (test code = LY%) 27.6 %     20.5-51.1  N    

      

 

                      MONOCYTE % (test code = MO%) 9.0 %      1.7-9.3    N      

    

 

                      EOSINOPHIL % (test code = EO%) 1.9 %      0.0-7.0    N    

      

 

                      BASOPHIL % (test code = BA%) 0.6 %      0-2.5      N      

    

 

                      NEUTROPHIL # (test code = NT#) 4.20 x10 3/uL 1.80-7.70  N 

         

 

                      LYMPHOCYTE # (test code = LY#) 1.91 x10 3/uL 1.00-4.80  N 

         

 

                      MONOCYTE # (test code = MO#) 0.62 x10 3/uL 0.00-0.80  N   

       

 

                      EOSINOPHIL # (test code = EO#) 0.13 x10 3/uL 0.00-0.45  N 

         

 

                      BASOPHIL # (test code = BA#) 0.04 x10 3/uL 0.0-0.20   N   

       





CBC W/AUTO [id number] 09:54:00* 



                      Test Item  Value      Reference Range Interpretation Comme

nts

 

                                                    WHITE BLOOD CELL (test code 

= 

WBC)            6.4 x10 3/uL    4.8-10.8        N               

 

                                                    RED BLOOD CELL (test code = 

RBC)            4.94 x10 6/uL   4.20-5.40       N               

 

                      HEMOGLOBIN (test code = HGB) 14.1 g/dL  12.0-16.0  N      

    

 

                      HEMATOCRIT (test code = HCT) 42.6 %     37.0-47.0  N      

    

 

                                                    MEAN CELL VOLUME (test code 

= 

MCV)            86.2 fL         81.0-99.0       N               

 

                      MEAN CELL HGB (test code = MCH) 28.5 pg    27-31      N   

       

 

                                                    MEAN CELL HGB CONCENTRATION 

(test code = MCHC) 33.1 G/DL       33-36.5         N               

 

                                                    RED CELL DISTRIBUTION WIDTH 

(test code = RDW) 13.4 %          12.9-16.9       N               

 

                                                    PLATELET COUNT (test code = 

PLT)            283 x10 3/uL    150-440         N               

 

                                                    MEAN PLATELET VOLUME (test c

ode 

= MPV)          11.2 fL         8.9-12.4        N               

 

                      NEUTROPHIL % (test code = NT%) 60.9 %     42.2-75.2  N    

      

 

                      LYMPHOCYTE % (test code = LY%) 25.8 %     20.5-51.1  N    

      

 

                      MONOCYTE % (test code = MO%) 9.9 %      1.7-9.3    H      

    

 

                      EOSINOPHIL % (test code = EO%) 2.7 %      0.0-7.0    N    

      

 

                      BASOPHIL % (test code = BA%) 0.5 %      0-2.5      N      

    

 

                      NEUTROPHIL # (test code = NT#) 3.87 x10 3/uL 1.80-7.70  N 

         

 

                      LYMPHOCYTE # (test code = LY#) 1.64 x10 3/uL 1.00-4.80  N 

         

 

                      MONOCYTE # (test code = MO#) 0.63 x10 3/uL 0.00-0.80  N   

       

 

                      EOSINOPHIL # (test code = EO#) 0.17 x10 3/uL 0.00-0.45  N 

         

 

                      BASOPHIL # (test code = BA#) 0.03 x10 3/uL 0.0-0.20   N   

       





UR HCG [id number] 09:19:00* 



                      Test Item  Value      Reference Range Interpretation Comme

nts

 

                      UR HCG QUAL (test code = HCGQLU) NEGATIVE   NEGATIVE      

        





[id number] 16:50:00* 



                      Test Item  Value      Reference Range Interpretation Comme

nts

 

                                                    SURGICAL (test code 

= SR)                                   --------------------------

--------------------------

--------------------------

--------------RUN DATE: 

22 [place] [facility] 

- LAB PAGE 1 RUN TIME: 

1650 Specimen Inquiry RUN 

USER: INTERFACE  

--------------------------

--------------PATIENT: 

JORDYN BENNETT ACCT 

#: [id number] LOC: [facility] 

U #: [id number] AGE/SX: 

41/F ROOM: RE22REG DR: 

Bola Minaya MD : 

[birth year] BED:  DIS: 

STATUS: DEP [facility] TLOC: 

--------------------------

-------------- SPEC #: 

[id number]- RECD: 

 STATUS: KENAN POPE #: [id number] YA: 

 [facility] DR: 

Bola Minaya MD 

ENTERED:  SP 

TYPE: SURGICAL OTHR DR: 

Senthil Provider 

ORDERED: 98641/2, 71504, 

ANATOMIC SPEC HISTOLOGY: 

TISSUE ID BLK PCS OZZY LEV 

/ PROCEDURE DISPOSITION 

_______________ ____ 

______ ___ ___ ___ ___ 

_______________ 

___________ STOM A 1 3 

GASTRO ESOPH  B 1 3 

TISSUES: A. STOMACH 

BIOPSY/POLYP - Stomach 

Body Lesion Bx B. GASTRO 

ESOPHAGEAL - EG Junction 

Bx FINAL DIAGNOSIS A. 

STOMACH, BODY, LESION, 

BIOPSY: - Fundic gland 

polyp. - Negative for 

intestinal metaplasia, 

dysplasia, and malignancy. 

- An immunohistochemical 

stain for Helicobacter 

pylori is negative. B. 

GASTROESOPHAGEAL JUNCTION, 

MUCOSAL BIOPSY: - 

Esophageal squamous mucosa 

with focal mild reactive 

changes. - Gastric cardiac 

mucosa with mild chronic 

carditis. - Negative for 

intestinal metaplasia, 

dysplasia, and malignancy. 

GROSS DESCRIPTION A. 

Received in formalin, 

labeled with patient's 

name and date of birth 

designated specimenA 

"stomach body lesion 

biopsy". It consists of 

four tan soft tissue 

fragments measuring0.3 cm 

each. The specimens are 

submitted entirely in 

cassette A. B. Received in 

formalin, labeled with 

patient's name and date of 

birth designated specimenB 

"GE junction biopsy". It 

consists of three tan soft 

tissue fragments measuring 

0.3 cmeach. The specimens 

are submitted entirely in 

cassette B. KOMAL/ks 

Technical component 

performed at [facility] [facility], [place], [address] 

** CONTINUED ON NEXT PAGE 

** 

--------------------------

--------------RUN DATE: 

22 [facility] 

- LAB PAGE 2 RUN TIME: 

1650 Specimen Inquiry RUN 

USER: INTERFACE 

--------------------------

--------------SPEC #: 

WMV-I- PATIENT: 

JORDYN BENNETT 

#[id number] 

(Continued)---------------

--------------------------

--------------------------

------------------------- 

MICROSCOPIC DESCRIPTION 

PERFORMED AND INCORPORATED 

INTO THE FINAL DIAGNOSIS. 

CLINICAL INFORMATION Small 

Sliding H/H, esophagitis, 

Subcentimeter Sesslie 

Lesion--------------------

--------------------------

--------------------------

-------------------- 

Signed SIGNATURE ON FILE 

Rusty Alvarado 22 

1650 

--------------------------

-------------- ** END OF 

REPORT **                                                   





UR HCG [id number] 11:13:00* 



                      Test Item  Value      Reference Range Interpretation Comme

nts

 

                      UR HCG QUAL (test code = HCGQLU) NEGATIVE   NEGATIVE      

        





Chest Pa And Lat (2 Views)Chest Pa And Lat (2 Views)Thyroid Para Parotid Gland
Thyroid Para Parotid Gland



Notes





                          Date/Time    Note         Provider     Source

 

                                        2024 11:33:00 

[facility] ([facility])

OB Postpart Progr Note

REPORT#:[id number] REPORT STATUS: Signed

REPORT INITIALIZATION DATE:24 TIME: 1133



PATIENT: JORDYN BENNETT UNIT #: [id number]

ACCOUNT#: [id number]  ROOM/BED: [address]

: [birth year] AGE: 43 SEX: F ATTEND: 

Maria D Rodriguez MD

ADM DT: 24 AUTHOR: Maria D Rodriguez MD

REPT SERVICE DT/TIME: 24 1133

* ALL edits or amendments must be made on the 

electronic/computer document *





Subjective



Subjective

Admission EGA:

Weeks: 36

Days: 2

EGA at delivery (wks/days): 36 weeks

Status/Day: post operative (day #3)

Patient reports:

Patient reports:

Yes no complaints, Yes normal lochia, Yes pain 

management effective, Yes

tolerating po well, Yes voiding well, Yes voiding 

without pain, Yes tolerating

ambulation, Yes flatus

Comments:

She reports some burning on the upper right side 

of the incision. Pain is much

better controlled.



Objective



Nursing Documentation Review

Nursing Data:

The data set between the solid lines has been 

imported from nursing

documentation. Any exceptions have been noted 

below under Provider comments.

_________________________________________________

_______________________________



Feeding preference:



Post partum hemorrhage risk score: HighRisk

_________________________________________________

_______________________________



Provider comments on imported nursing data: []

_____________________________________________





General

VS:

Vital Signs:

Date Time Temp Pulse Resp B/P B/P Pulse O2 O2 

Flow FiO2

Mean Ox Delivery Rate

921 97.9 87 20 103/68 79.6 100



PATIENT WEIGHT:



Weight (lb): 180

Weight (oz):

Weight (kg): 81.800





Physical Exam

Neuro:

Exam: alert, oriented x3, normal speech, CNII-XII 

grossly intact

Abdomen: soft, no abnormal tenderness, no 

guarding, no rebound tenderness

Incision site: well approximated edges, dry, no 

drainage, no inflammation

Uterus: firm, involution appropriate, non-tender

Fundus: firm, below the umbilicus

Lochia: normal

Lower extremities:

Edema: trace





Diagnosis, Assessment Plan



Diagnosis, Assessment Plan

Assessment: nml postpartum progress, acute blood 

loss anemia

Plan: routine postpartum care, discharge today



Electronically Signed by Maria D Rodriguez MD on 

24 at 1135



RPT #:[id number]

***END OF REPORT***



                                                    [facility]

 

                                        2024 17:09:00 

[facility] ([facility])

OB Disch Postpartum

REPORT#:[id number] REPORT STATUS: Signed

REPORT INITIALIZATION DATE:24 TIME: 



PATIENT: JORDYN BENNETT UNIT #: [id number]

ACCOUNT#: [id number] ROOM/BED: [address]

: [birth year] AGE: 43 SEX: F  ATTEND: 

Maria D Rodriguez MD

ADM DT: 24 AUTHOR: Maria D Rodriguez MD

REPT SERVICE DT/TIME: 24

* ALL edits or amendments must be made on the 

electronic/computer document *





Discharge Summary



General

Assessment: nml postpartum progress, acute blood 

loss anemia

Date of admission:

Date of admission: 24



Admission diagnosis: placenta previa

Hospital course: primary  admit, nml 

postop/postpart care

Procedures: primary CS delivery

Discharge condition: stable

Discharge to: Home/Self Care

Discharge diagnosis: full-term uncomp delivery

Discharge management: less than 30 mins

Baby A:

Birth status: live born

Gender: female

APGAR 1 minute: 8

APGAR 5 minutes: 9

Anomalies:

none

Nursing data:

The data set between the solid lines has been 

imported from nursing

documentation. Any exceptions have been noted 

below under Provider comments.

_________________________________________________

____________________________



Delivery date infant A: 24 Delivery time 

infant A: 1223

Birthweight (gm) infant A: 2640

Feeding preference:

Gender infant A: Female

Apgar 1 minute infant A: 8

Apgar 5 minutes infant A: 9

Apgar 10 minutes infant A:

_________________________________________________

_____________________________



Provider comments on imported nursing data: []

_____________________________________________





Discharge Instructions

Instructions: routine instr sheet given

Diet: Regular

Activity: As Tolerated, No Driving, No 

Frohna for 6 Wks, No Lifting >20lbs,

No Strenuous Activity

Additional discharge routines: Attending 

Follow-Up, Wound/Dressing Care

Wound/dressing care: Keep wound clean and dry

Discharge meds:

Stop taking the following medications:

Hydrocodone/Acetaminophen (HYDROcodone/APAP 

5/325) 1 TAB TAB

1 TABLET ORAL EVERY 3 HOURS AS NEEDED



IBUPROFEN (MOTRIN) 600 MG TAB

600 MILLIGRAM ORAL EVERY SIX HOURS AS NEEDED



Continue taking these medications:

PRENATAL VIT/FE FUMARATE/FA (Prenatal 

Multivitamin w/ Iron) 1 EACH TABLET

1 TABLET ORAL DAILY.



FLUTICASONE PROPIONATE (FLONASE 50 MCG/ACT NASAL) 

50 MCG/ACTUATION SPRAY





CETIRIZINE (CETIRIZINE) 10 MG TAB





PANTOPRAZOLE DR (PROTONIX) 20 MG TAB.DR

20 MILLIGRAM ORAL DAILY.



Start taking the following new medications:

IBUPROFEN (MOTRIN) 600 MG TAB

600 MILLIGRAM ORAL EVERY 6 HOURS AS NEEDED. as 

needed for PAIN SCALE 1-3 (

USE 1ST)

Qty = 30

No Refills



oxyCODONE (oxyCODONE) 5 MG TAB

5 MILLIGRAM ORAL EVERY 4 HOURS AS NEEDED. as 

needed for PAIN SCALE 4-6

Qty = 30

No Refills





Prescriptions: e-prescribe

Rx drug database reviewed: yes



Add'l Follow-up Appointments

Attending Physician:

Attending Physician:

Maria D Rodriguez MD

Phone: [phone]

Attending physician follow up timeframe: In 2-3 

weeks



Electronically Signed by Maria D Rodriguez MD on 

24 at 1710



RPT #:[id number]

***END OF REPORT***



                                                    [facility]

 

                                        2024 13:21:00 

[facility] ([facility])

OB Postpart Progr Note

REPORT#:[id number] REPORT STATUS: Signed

REPORT INITIALIZATION DATE:24 TIME: 132



PATIENT: JRODYN BENNETT UNIT #: [id number]

ACCOUNT#: [id number] ROOM/BED: [address]

: [birth year] AGE: 43  SEX: F ATTEND: 

Maria D Rodriguez MD

ADM DT: 24 AUTHOR: Maria D Rodriguez MD

REPT SERVICE DT/TIME: 24 1321

* ALL edits or amendments must be made on the 

electronic/computer document *





Subjective



Subjective

Admission EGA:

Weeks: 36

Days: 2

EGA at delivery (wks/days): 36 weeks

Status/Day: post operative (day #1)

Patient reports:

Patient reports:

Yes no complaints, Yes normal lochia, Yes pain 

management effective, Yes

tolerating po well, Yes voiding well, Yes voiding 

without pain, Yes tolerating

ambulation, No flatus

Comments:

No dizziness or lightheadedness with ambulation.



Objective



Nursing Documentation Review

Nursing Data:

The data set between the solid lines has been 

imported from nursing

documentation. Any exceptions have been noted 

below under Provider comments.

_________________________________________________

_______________________________



Feeding preference:



Post partum hemorrhage risk score: LowRisk

_________________________________________________

_______________________________



Provider comments on imported nursing data: []

_____________________________________________





General

VS:

Vital Signs:

Date Time Temp Pulse Resp B/P B/P Pulse O2 O2 

Flow FiO2

Mean Ox Delivery Rate

905 72.0

 09 98.3 51 18 97/60 100

 0453 98.1 54 18 90/58 68.7 98 Room air

 2355 97.9 60 18 100/64 76.3 96 Room air

2010 97.7 64 18  98/67 77.3 100 Room air

 1800 98.3 55 16 112/63 98

 1700 77.0

 1700 52 18 113/56 100

 1655 54 17 100

 1650 51 17 100

 1646 78.0

 1646 108/54

 1645 60 19 100

 1640 51 16 100

 1635 52 17 100

 1630  77.0

 1630 50 18 104/60 100

 1625 52 17 100

 1620 57 19 100

 1615 77.0

 1615 60 101/58 100

 1610 53 100

11/05 1605 51  100

11/05 1600 75.0

11/05 1600 55 101/57 100

11/05 1555 54 100

11/05 1550  58 31 100

11/05 1545 76.0

11/05 1545 58 30 104/59 100

11/05 1540 51 16 100

11/05 1535 54 21 100

11/05 1530 74.0

11/05 1530 58 31 101/58 100

11/05 1525 55 19 100

11/05 1520 52 15 100

11/05 1515 74.0

11/05 1515 52 0 100/57 100

11/05 1510 51 10 100

11/05 1505 53 17 100

11/05 1500 73.0

11/05 1500 54 14 99/57  100

11/05 1455 52 15 100

11/05 1450 53 15 100

11/05 1445 77.0

11/05 1445 52 15 102/61 100

11/05 1440 51 16 100

11/05 1435 55 16 100

11/05 1430 78.0

11/05 1430 56 17 102/62 100

11/05 1425 67 22 100

11/05 1420 60 15 100

11/05 1416  70.0

11/05 1416 98/53

11/05 1415 55 6 100

11/05 1410 53 12 100

11/05 1405 55 16 100

11/05 1400 72.0

11/05 1400 52 8 98/53 100

11/05 1355 51 14 100

11/05 1350 53 17 100

11/05 1346 69.0

11/05 1346 95/53

11/05 1345 56 18 100

11/05 1340 59 16 100

11/05 1335 60 19 100

11/05 1330 70.0

11/05 1330 54  97/54 100



PATIENT WEIGHT:



Weight (lb): 180

Weight (oz):

Weight (kg): 81.800





Physical Exam

Neuro:

Exam: alert, oriented x3, normal speech, CNII-XII 

grossly intact

Abdomen: soft, no abnormal tenderness, no 

guarding, no rebound tenderness

Incision site: well approximated edges, dry, no 

drainage, no inflammation

Uterus: firm, involution appropriate, non-tender

Fundus: firm, below the umbilicus

Lochia: normal

Lower extremities:

Edema: trace



Result

Findings/Data:

Laboratory Tests:

649

Hematology

Hgb (10.1 - 13.8 g/dL) 8.3 L

Hct (32.5 - 41.8 %) 27.5 L









Diagnosis, Assessment Plan



Diagnosis, Assessment Plan

Assessment: nml postpartum progress, acute blood 

loss anemia

Plan: routine postpartum care, discharge tomorrow



Electronically Signed by Maria D Rodriguez MD on 

24 at 1322



RPT #:[id number]

***END OF REPORT***



                                                    [facility]

 

                                        2024 13:07:00 

[facility] ([facility])

OB Delivery Note

REPORT#:[id number] REPORT STATUS: Signed

REPORT INITIALIZATION DATE:24 TIME: 130



PATIENT: JORDYN BENNETT UNIT #: [id number]

ACCOUNT#: [id number]  ROOM/BED: 

: [birth year] AGE: 43 SEX: F ATTEND: 

Maria D Rodriguez MD

ADM DT: 24 AUTHOR: Maria D Rodriguez MD

REPT SERVICE DT/TIME: 24 1307

* ALL edits or amendments must be made on the 

electronic/computer document *





OB Delivery



Nursing Documentation Review

Nursing data:

The data set between the solid lines has been 

imported from nursing

documentation. Any exceptions have been noted 

below under Provider comments.

_________________________________________________

_______________________________

_



ROM date: ROM time:

Membranes rupture method: AROM

Amniotic fluid color:

Amniotic fluid amount:

Steroids prior to arrival:

Antibiotic prophylaxis given: Yes

Post partum hemorrhage risk score: LowRisk



Delivery date infant A: Delivery time infant A:

Birthweight (gm) infant A:

Weight (lb) infant A: Weight (oz)

infant A:

Gender infant A: Female

Apgar 1 minute infant A:

Apgar 5 minutes infant A:

Apgar 10 minutes infant A:

Cord pH obtained infant A:

Vacuum time infant A:

Vacuum # pulls infant A:

Vacuum # popoffs infant A:



QBL at delivery:

_________________________________________________

_______________________________

__



Provider comments on imported nursing data: []

_____________________________________________





Pre-delivery

GBS status:

GBS status: negative

 evaluation at delivery: NRP certified 

personnel

Admission EGA:

Weeks: 36

Days: 2

EGA at delivery (wks/days): 36 weeks



Blood Loss/Details

Blood loss at delivery: no more than expected



Baby A Information

Baby A information

Delivery date: 24

Delivery time: 1223

Birth status: live born

Wt of baby (grams): 2640

Wt of baby (lbs/oz): 5#13oz

Gender: female

APGAR 1 minute: 8

APGAR 5 minutes: 9

Presentation: vertex

Anomalies:

none

ABG details Baby A

Cord blood gases: not collected

Nuchal cord Baby A

Nuchal cord: no



Operative Note-Full

ORM Surgeries:

Surgery Date and Time: 2024

Proposed Primary Procedure:  SECTION(P 

C/S PLACENTA PREVIA





Nursing Data:

The data set between the solid lines has been 

imported from nursing

documentation. Any exceptions have been noted 

below under Provider comments.

_________________________________________________

_______________________________

_



ROM date: ROM time:

Membranes rupture method: AROM

Amniotic fluid color:

Amniotic fluid amount:

Steroids prior to arrival:

Antibiotic prophylaxis given: Yes

Post partum hemorrhage risk score: LowRisk



Delivery date infant A: Delivery time infant A:

Birthweight (gm) infant A:

Weight (lb) infant A:  Weight (oz)

infant A:

Gender infant A: Female

Apgar 1 minute infant A:

Apgar 5 minutes infant A:

Apgar 10 minutes infant A:

Cord pH obtained infant A:

Vacuum time infant A:

Vacuum # pulls infant A:

Vacuum # popoffs infant A:



QBL at delivery:

_________________________________________________

_______________________________

__



Provider comments on imported nursing data: []

_____________________________________________



)(Start date: 24

)(Start time: 1220

)(Pre-procedure diagnosis:

SIUP at 36 weeks, Placenta Previa

)(Post-procedure diagnosis: same as pre-procedure 

dx

)(Procedures performed:

Primary low transverse  Section

)(Technique/Procedure:

Patient was taken to the operating room where 

epidural anesthesia was placed and

found to be adequate. She was then positioned in 

the dorsal supine position

with a leftward tilt. She was prepped and draped 

in the usual sterile fashion.

A time out was performed.



A Pfannenstiel skin incision was made with the 

knife and carried down to the

underlying layer of fascia with the knife. The 

fascia was knicked in the

midline, and the incision was extended laterally 

with the Leung scissors. The

fascia was grasped, elevated, and transected from 

the underlying rectus muscles

with the Leung scissors. The muscles were split in 

the midline bluntly. The

peritoneum was entered bluntly and the incision 

extended bluntly with good

visualization of the bladder. A bladder blade was 

inserted. The vesicouterine

peritoneum was grasped, elevated, and transected 

with the Metzenbaum scissors.

A bladder flap was created digitally. The bladder 

blade was replaced. A low

transverse incision was made the scalpel. This 

incision was extended bluntly.

The infant's head was grasped, brought to the 

hysterotomy, and delivered

atraumatically. The infant's shoulders and body 

easily followed. The infant's

nose and mouth were bulb suctioned, and after a 

brief delay, the cord was

clamped x 2 and cut. The infant was handed off to 

awaiting nursery nurse. The

placenta delivered spontaneously and intact. The 

uterus was exteriorized and

cleared of all clots and debris.



The hysterotomy was repaired with 0 vicryl in a 

running, locked fashion. A

large expanding hematoma of the superior incision 

on the right side was noted.

Once the hysterotomy was closed, this was excised 

for 1 cm to allow evacuation

of blood clots. The myometrium was then 

compressed with multiple mattress

sutures of 0 vicryl to achieve successful 

compression. No further hematoma

expansion was then noted. Hemostasis was noted. 

The uterus was returned to the

abdomen. The gutters were cleared of all clots 

and debris. The hysterotomy was

again inspected and noted to be hemostatic. 

Surgicel was placed over the

hysterotomy. The peritoneum was closed with the 

muscles with 3-0 vicryl in a

running fashion. The rectus muscles were 

inspected and noted to be hemostatic.

The fascia was closed with 0 vicryl in a running 

fashion. The subcutaneous

tissue was irrigated and noted to be hemostatic. 

The subcutaneous tissue was

reapproximated with 2-0 plain gut in a running 

fashion. The skin was closed

with 4-0 monocryl in a subcuticular stitch. The 

incision was covered with steri

strips. Sterile dressing was applied.



The procedure was then completed. The patient 

tolerated the procedure well.

All sponge, lap, needle, and instrument counts 

were correct x 3. The patient

was taken to the recovery room with infant in 

stable condition.

)(Primary Surgeon: MD Jennifer

)(Assistant(s): SA Daphney

)(Anesthesia: spinal anesthetic

)(Operative findings:

Uterus normal in appearance with normal fallopian 

tubes and ovaries bilaterally.

The placenta was completely covering the cervix. 

Did have to cut through the

placenta to deliver baby, so did not delay cord 

clamping. Large expanding

hematoma of the Right anterior hysterotomy site 

was decompressed and then suture

compressed. No further expansion noted. Surgicel 

was placed over the

hysterotomy.

)(Complications: none

)(Specimens removed/altered: placenta

)(Implant(s): Surgicel

Fluids:

1500 mL

Urine output:

100 mL

Disposition: PACU

Counts:

Sponge count: correct

Instrument count: correct

Needle count: correct



 Delivery



 Delivery

 section

 indication: placenta previa

Priority: scheduled

Antibiotic prior to incision: 1 dose

)(SCDs applied activated: Yes

Uterine incision: low transverse

Consent: indication discussed, questions 

answered, pt consent to op delivery

Mother's condition: mother stable

Infant's condition: infant to NICU



Electronically Signed by Maria D Rodriguez MD on 

24 at 1323



RPT #:[id number]

***END OF REPORT***



                                                    [facility]

 

                                        2024 08:56:00 

[facility] ([facility])

OB Admission / H P

REPORT#:[id number] REPORT STATUS: Signed

REPORT INITIALIZATION DATE:24 TIME: 08



PATIENT: JORDYN BENNETT UNIT #: [id number]

ACCOUNT#: [id number]  ROOM/BED: [address]

: [birth year] AGE: 43 SEX: F ATTEND: 

Maria D Rodriguez MD

ADM DT: 24 AUTHOR: Maria D Rodriguez MD

REPT SERVICE DT/TIME: 24 0856

* ALL edits or amendments must be made on the 

electronic/computer document *





OB History



Nursing Documentation Review

Nursing data:

The data set between the solid lines has been 

imported from nursing

documentation. Any exceptions have been noted 

below under Provider comments.

_________________________________________________

_______________________________



Current pregnancy data

Steroids prior to arrival:

ROM date: ROM time:

EDC date:

Gestational age (labor triage):

Post partum hemorrhage risk score:



Prior pregnancy history

: Para: Term:

:

Abortions spontaneous: Abortions induced:

Living children: Ectopic:

Stillbirths: Live births:

 deaths:

Number of previous C/S:



Reported maternal labs/data

Blood type:

Rh type:

Rubella:

Hepatitis B:

HIV exposure test:

VDRL:

Group B beta strep:

Rho(D) immune globulin this preg:

Monitor mode - UA:

Feeding preference:

_________________________________________________

_______________________________



Provider comments on imported nursing data: []

____________________________________________



Chief complaint: scheduled 

HPI:

Pt is a 42 yo  at 36w2d here for primary 

LTCS for placenta previa. She

has had no episodes of bleeding. Patient's 

pregnant has been complicated by

persistent placenta previa for which she was 

under comanagement with [facility] who

recommends a delivery today. She also has a 

personal history of gastroschisis

with repair, and her prior operative note from 

her gastric bypass surgery was

significant for extensive intra-abdominal 

adhesive disease. She has been anemic

and on iron therapy with IV iron.



G1 FT , M, 8#6oz, 2012

Pregnancy history:

: 2

Term: 1

: 0

Abortus: 0

Living children: 1

Previous : none

Current pregnancy:

Best EDC: 24

Admission EGA (weeks) 36

Admission EGA (days) 2

EDC based on: ultrasound, 1st trimester

Conditions of pregnancy: anemia, placental 

abnormalities

Labs:

Blood type: O

Rh: positive

Rubella: immune

Hepatitis B: negative

HIV: negative

STD: negative

Syphilis: currently negative

GBS: negative

Genetic testing: NIPT neg



Past History

Additional Medical History:

Gastroschisis s/p repair as child, GERD, Anemia

Additional Surgical History:

Gastroschisis repair, Gastric sleeve

Additional Family History

Noncontributory

Alcohol Use Denies EtOH use

Drug Use Denies recreational drugs

Smoking status for patients 13 years old or 

older: Never Smoker

Medications:

Home Medications:

PRENATAL VIT/FE FUMARATE/FA (Prenatal 

Multivitamin w/ Iron) 1 TAB PO DAILY

Hydrocodone/Acetaminophen (HYDROcodone/APAP 

5/325) 1 TAB PO Q3HPRN

IBUPROFEN (MOTRIN) 600 MG PO Q6HPRN





Allergies:

Coded Allergies:

No Known Drug Intolerances (NONE 24)





Review of Systems

All systems rev neg: except as marked



Objective



General

VS:

PATIENT WEIGHT:



Weight (lb):

Weight (oz):

Weight (kg): 81.328152





Physical Exam

Neuro:

Exam: alert, oriented x3, normal speech, CNII-XII 

grossly intact

Abdomen: gravid, soft, no abnormal tenderness, no 

guarding, no rebound

tenderness

Musculoskeletal: normal inspection

Genitourinary: no bladder distention

Uterine activity:

Monitor: toco

Frequency (description): none

Membranes:

Membranes: Intact

Lower extremities:

Edema: trace

Baby A:

Baby A baseline: 135 bpm



Results

Findings/Data:

Laboratory Tests:



1423

Chemistry

Sodium (136 - 145 mEq/L) 138

Potassium (3.4 - 4.5 mEQ/L) 3.7

Chloride (98 - 107 mEq/L) 107

Carbon Dioxide (22 - 31 mEq/L) 22

Anion Gap (10 - 20) 12.7

BUN (8 - 23 mg/dL) 9

Creatinine (0.55 - 1.02 mg/dL) 0.7

Glomerular Filtr Rate (>60 ml/min) 110.67

Glucose (74 - 106 mg/dL) 94

Calcium (8.3 - 10.6 mg/dL) 9.2

Total Bilirubin (0.3 - 1.2 mg/dL) 0.5

AST (< 34 units/L) 24

ALT (10 - 49 units/L) 13

Total Alk Phosphatase (46 - 116 units/L) 115

Total Protein (5.7 - 8.2 g/dL) 6.4

Albumin (3.2 - 4.8 g/dL) 4.1

Hematology

WBC (6.5 - 12.3 K/mm3) 8.4

RBC (3.51 - 4.69 M/mm3) 4.14

Hgb (10.1 - 13.8 g/dL) 10.9

Hct (32.5 - 41.8 %) 34.8

MCV (84.6 - 96.6 fL) 84.1 L

MCH (27.3 - 33.9 pg) 26.3 L

MCHC (32.0 - 34.2 gm/dL) 31.3 L

RDW (12.2 - 16.3 %) 19.9 H

Plt Count (134 - 363 K/mm3) 180

MPV (9.2 - 12.7 fL) 11.8

Neut % (Auto) (57.9 - 77.3 %)  69.6

Lymph % (Auto) (14.5 - 29.7 %) 18.7

Mono % (Auto) (3.6 - 10.2 %) 9.1

Eos % (Auto) (0.0 - 3.0 %) 1.4

Baso % (Auto) (0.1 - 0.9 %) 0.2

Neut # (Auto) (K/mm3) 5.8

Lymph # (Auto) (K/mm3) 1.6

Mono # (Auto) (K/mm3) 0.8

Eos # (Auto) (K/mm3) 0.12

Baso # (Auto) (K/mm3) 0.0

Serology

Treponema pallidum Ab (NONREACTIVE) NONREACTIVE

Hep Bs Antigen (NONREACTIVE) NON REACTIVE

Hepatitis C Antibody (NONREACTIVE) NONREACTIVE

Hep C Ab Signal/Cutoff (<0.80) 0.12

HIV 1 2 Antibody (NONREACTIVE) NONREACTIVE









Diagnosis, Assessment Plan



Diagnosis, Assessment Plan

Free Text A P:

Pt is a 42 yo  at 36w2d with complete 

posterior placenta previa here for

primary LTCS

- Pt counseled on the risks and benefits of 

 Section delivery and

desires to proceed.

- Patient with a history of intra-abdominal 

adhesive disease due to her prior

surgery. Reviewed the incresaed risks of bleeding 

and injury to surrounding

structures including the bowel and bladder. All 

questions answered.

- T C x 2 units

- Ancef on call to OR



Electronically Signed by Maria D Rodriguez MD on 

24 at 0907



RPT #:[id number]

***END OF REPORT***



                                                    [facility]

 

                                        2022 12:20:00 [id number]

[place] [address]

PATIENT NAME: JORDYN BENNETT ADMIT DATE: 

22

ACCOUNT NO: [id number] ROOM NO:

MEDICAL RECORD NO: [id number] AGE: 41

REPORT TYPE: eELECTROCARDIOGRAM SEX: F



ADMITTING PHYSICIAN:

ATTENDING PHYSICIAN:





Order:

[id number]

Test Reason :

Test Date/Time Stamp:

 12:20:48

Blood Pressure : ***/*** mmHG

Vent. Rate : 060 BPM Atrial Rate : 060 BPM

P-R Int : 140 ms QRS Dur : 084 ms

QT Int : 418 ms P-R-T Axes : 068 056 044 degrees

QTc Int : 418 ms



Normal sinus rhythm

Normal ECG

When compared with ECG of 2022 09:49,

No significant change was found

Confirmed by RAHUL SIDDIQUI MD ([id number]) on 

2022 7:16:43 AM



Referred By: Self Referred Confirmed by:RAHUL SIDDIQUI MD











Electronically Signed by Rahul Siddqiui MD on 

22 at 0716









































PATIENT NAME: JORDYN BENNETT ACCOUNT #: 

[id number]                                        [facility]

 

                                        2022 10:59:00 

[facility] ([facility])

EMERGENCY PROVIDER REPORT

REPORT#:[id number] REPORT STATUS: Signed

DATE:22 TIME: 1059



PATIENT: JORDYN BENNETT UNIT #: [id number]

ACCOUNT#: [id number] ROOM: BED:

AGE: 41 SEX: F PCP PHYS: DOES_NOT KNOW

SERVICE DT: 22 AUTHOR: Cortez Pickard MD





* ALL edits or amendments must be made on the 

electronic/computer document *





HPI-General Illness



Free Text HPI Notes

Free Text HPI Notes

41-year-old female past medical history of 

bariatric surgery otherwise no

significant medical history comes to the ER after 

her second syncopal episode in

the last few weeks. Both episodes happened after 

patient's changed from a

reclined position to a standing position. Both 

episodes were brief. Patient

denies any neurological deficits. Patient is 

having headache and some pain

around her coccyx. Patient denies any chest pain, 

shortness of breath, vomiting

, nausea, abdominal pain, stool changes.



General

Initial Greet Date/Time 22 1059



Presentation

Chief Complaint syncope



Review of Systems



ROS Statements

All systems rev neg except as marked.



Past Medical History - Adult

Stated Complaint SYNCOPE-FALL

Allergies

Coded Allergies:

No Known Allergies (22)



Home Medications

Reported Medications

Spironolactone (Aldactone) 100 MG PO DAILY





Pt reports no significant: Past medical history

Past Surgical History:

Reports: Bariatric procedure.

Family History:

Reports: Hypertension.

Alcohol Use Alcohol use

Drug Use Denies recreational drugs

Smoking status for patients 13 years old or 

older: Never Smoker



Physical Exam



Vital Signs

Vital Signs

First Documented:

Result Date Time

Pulse Ox  100  1059

B/P 134/82  1059

B/P Mean 99  1059

O2 Delivery Room air  1059

Temp 36.6  1059

Pulse 73  1059

Resp 16  1059



Last Documented:

Result Date Time

Pulse Ox 100  1059

B/P 134/82  1059

B/P Mean 99  1059

O2 Delivery Room air  1059

Temp 36.6  1059

Pulse 73  1059

Resp 16  1059





Review of Vital Signs Reviewed



Free Text PE Notes

Free Text PE Notes

General: Well-appearing, well-nourished no 

apparent distress

Head: Normocephalic atraumatic

Eyes: EOMI, PERRLA, normal conjunctiva, no 

scleral icterus,

HEENT: Airway patent, mucous membranes moist, 

pharynx normal.

Respiratory/chest: Breath sounds normal, breath 

sounds equal bilaterally, no

respiratory distress, no rales, no rhonchi, no 

wheezing

Cardiovascular: Heart rate normal, regular 

rhythm, heart sounds normal,

peripheral circulation normal

Abdomen/GI: Soft, no guarding, no rebound, no 

distention, no hernia, no palpable

mass, no pulsatile mass, nontender,

MS back: Normal inspection, non-tender, no 

costovertebral angle tenderness.

Skin: Color normal, warm, dry, normal turgor.

Extremities: No edema, full range of motion, 

neurovascularly intact.

Neuro: Alert and oriented x3, cranial nerves II 

through XII grossly intact, no

motor deficit, no sensory deficit, no cerebellar 

signs, normal gait.

Psych: Normal affect, normal mood, normal 

concentration, normal insight,



Interpretation Diagnostics



Lab Results Interpretation

Results

Laboratory Tests







22 1111:

[Embedded Image Not Available]

Laboratory Tests:



1111  1111

Chemistry

Sodium (136 - 145 mmol/L) 139

Potassium (3.5 - 5.1 mmol/L) 4.0

Chloride (98 - 107 mmol/L) 106

Carbon Dioxide (20 - 31 mmol/L) 25

BUN (9 - 23 mg/dL) 8 L

Creatinine (0.55 - 1.02 mg/dL) 0.80

Glomerular Filtr Rate (>60 mL/min) >=60 max 

estimate

Glucose (74 - 106 mg/dL) 80

Calcium (8.7 - 10.4 mg/dL) 9.2

Serum HCG, Qual (NEGATIVE) NEGATIVE

Hematology

WBC (4.8 - 10.8 x10 3/uL)  6.9

RBC (4.20 - 5.40 x10 6/uL) 4.94

Hgb (12.0 - 16.0 g/dL) 14.8

Hct (37.0 - 47.0 %)  43.6

MCV (81.0 - 99.0 fL) 88.3

MCH (27 - 31 pg) 30.0

MCHC (33 - 36.5 G/DL) 33.9

RDW (12.9 - 16.9 %) 13.4

Plt Count (150 - 440 x10 3/uL) 267

MPV (8.9 - 12.4 fL) 11.8

Neut % (Auto) (42.2 - 75.2 %) 60.8

Lymph % (Auto) (20.5 - 51.1 %) 27.6

Mono % (Auto) (1.7 - 9.3 %) 9.0

Eos % (Auto) (0.0 - 7.0 %) 1.9

Baso % (Auto) (0 - 2.5 %) 0.6

Neut # (Auto) (1.80 - 7.70 x10 3/uL) 4.20

Lymph # (Auto) (1.00 - 4.80 x10 3/uL) 1.91

Mono # (Auto) (0.00 - 0.80 x10 3/uL) 0.62

Eos # (Auto) (0.00 - 0.45 x10 3/uL) 0.13

Baso # (Auto) (0.0 - 0.20 x10 3/uL) 0.04





1111

Chemistry

Troponin I High Sens (27.36 - 66.23 pg/mL) < 2.5 

L



Recent Impressions:

RADIOLOGY - XR CHEST 1 V  1130

*** Report Impression - Status: SIGNED Entered: 

2022 1216



IMPRESSION:



No active disease in the chest.

Impression By: Leah BACA M.D.

RADIOLOGY - XR SACRUM/COCCYX 2 + V  1130

*** Report Impression - Status: SIGNED Entered: 

2022 1216



IMPRESSION:



No acute osseous abnormality.

Impression By: Leah BACA M.D.

CAT SCAN - CT HEAD/BRAIN W/O CONT  1130

*** Report Impression - Status: SIGNED Entered: 

2022 1147



IMPRESSION:





No acute intracranial abnormality. No acute 

hemorrhage, mass lesion

or infarct.

Impression By: Leah BACA M.D.







ECG #1 Interpretation

Time 1233

NL ECG Interpretation Normal rate, Normal sinus 

rhythm, No acute ischemic

changes, No STEMI



Re-Evaluation MDM



Free Text MDM Notes

Free Text MDM Notes

Patient will follow up with primary care doctor 

in 1 to 2 days and return

immediately for any new or worsened symptoms.



ED Course

Medication(s) Ordered

Medication(s) Ordered:

Central Nervous System Agents

Sig/Didier Start time Last

Medication Dose Route Stop Time Status Admin

Ibuprofen 800 MG X1ED STA  1234 DC

PO  1235



Electrolytic, Caloric, And Edwin

Sig/Didier Start time  Last

Medication Dose Route Stop Time Status Admin

Sodium Chloride 1,000 ML X1ED STA  1104 DC 

/14

IV  1203 1123







Patient Discharge Departure



Vital Signs/Condition

Vital Signs

First Documented:

Result Date Time

Pulse Ox 100  1059

B/P 134/82  1059

B/P Mean 99  1059

O2 Delivery Room air  1059

Temp 36.6  1059

Pulse 73  1059

Resp 16  1059



Last Documented:

Result Date Time

Pulse Ox 100  1059

B/P 134/82  1059

B/P Mean 99 / 1059

O2 Delivery Room air  1059

Temp 36.6  1059

Pulse 73  1059

Resp 16  1059



All vital signs available at the time of this 

entry have been reviewed.





Clinical Impression

Clinical Impression

Primary Impression: Syncope



Disposition Decision

Discharge

)( Discharged to Home Yes

)( Time 1235

)( Date 22



Discharge/Care Plan

Patient Instructions ED Coccyx or Sacrum 

Contusion, ED Vertigo, Unspecified

Additional Instructions

Return immediately for any new or worsened 

symptoms.

Referrals

Provider Referral: Oscar Tejada MD

Address:

[address] [place] [address]







Electronically Signed by Cortez Pickard MD on 

06/15/22 at 1316

RPT #:[id number]

***END OF REPORT***                                 [facility]

 

                                        2022 08:38:00 

[facility] ([facility])

Med Order Sheet

REPORT #: [id number] REPORT STATUS: Signed

DATE: 22 TIME: 838



PATIENT: JORDYN BENNETT UNIT #: [id number]

ACCOUNT #: [id number] ROOM #: [facility] BED: 1

: [birth year] AGE: 41 SEX: F ATTEND: 

Bola Minaya MD

ADM DT: 22 AUTHOR: Bola Minaya MD



****************************ATTENTION************

**********************

*EDITS and/or ADDENDA must be made in Patient 

Keeper for this note. *

* Edits and ammendments created in [employer] are 

not visible *

* in Patient Keeper or the legal medical record 

([facility]). *

*************************************************

**********************



Discharge Medication Reconciliation





DISCHARGE MEDICATION LIST

spironolactone tablet

Dose: 100 MG PO DAILY for acne/roscea





STOPPED HOME MEDICATIONS

Dc'd: ALLERGY EYE DROPS 1 * Each Eye BID for 

allergyDc'd: Altavera (28) 0.15

mg-0.03 mg tablet (levonorgestrel-ethinyl estrad) 

1 TAB PO DAILYDc'd:

Fluticasone Prop Nasal Spray (Flonase Nasal 

Spray) 1 SPRAY Nasal BID (One

spray in each nostril.)Dc'd: loratadine tablet 10 

MG PO BEDTIMEDc'd:

RABEprazole Tab (NF) (Aciphex Tab (NF)) 20 MG PO 

DAILY



STOPPED HOSPITAL MEDICATIONS

Dc'd: Enoxaparin 30 mg/0.3 ml Inj (Lovenox 30 

mg/0.3 ml Inj) 30MG SubQ

DAILYDc'd: KCL 20mEq + NS 1000ML (NS + KCL 20mEq 

1000ML) 20MEQ 125 MLS/HR IV

Dc'd: Ketorolac Inj (Toradol Inj) 30MG IV 

Q6HRDc'd: morphine Inj 2MG IV Q2H

PRN pain scale 4-6Dc'd: morphine Inj 4MG IV Q2H 

PRN pain scale 1-3Dc'd:

Ondansetron Inj (Zofran Inj) 4MG IV Q8H PRN 

nausea and vomitingDc'd:

Promethazine Inj (Phenergan Inj) 12.5MG 101 

MLS/HR IV Q6H PRN nausea and

vomitingin Sodium Chloride 0.9% (NS) 50ML

Electronically Signed in PatientKeeper by Bola Minaya on 22 08:38







Electronically Signed by Bola Minaya MD on 

22 at 0838

****************************ATTENTION************

**********************

*EDITS and/or ADDENDA must be made in Patient 

Keeper for this note. *

* Edits and ammendments created in [facility] are 

not visible *

* in Patient Keeper or the legal medical record 

([facility]). *

*************************************************

**********************

RPT #: [id number]

***END OF REPORT***                                 [facility]

 

                                        2022 08:36:00 

[facility]

General Surg. D/C Summary

REPORT #: [id number] REPORT STATUS: Signed

DATE: 22 TIME: 836



PATIENT: JORDYN BENNETT UNIT #: [id number]

ACCOUNT #: [id number]  ROOM #: [id number] BED: 1

: [birth year] AGE: 41 SEX: F ATTEND: 

Bola Minaya MD

ADM DT: 22 AUTHOR: Bola Minaya MD



****************************ATTENTION************

**********************

*EDITS and/or ADDENDA must be made in Patient 

Keeper for this note. *

* Edits and ammendments created in [employer] are 

not visible *

* in Patient Keeper or the legal medical record 

([facility]). *

*************************************************

**********************



-- HOSPITAL COURSE --



HOSPITAL COURSE:

POD 1 Discharge Note



Discharge date:2022



Procedure(s): R/LSG, R/LRRHH



Current status: Patient is awake and alert. 

Ambulating in the hallway with

assistance. Tolerating small volumes of bariatric 

clear liquids. Minimal

abdominal pain. Denies persistent vomiting, 

fevers, chills, dizziness or

shortness of breath.



Physical examination:

general - non-ill, non-toxic, comfortable, good 

color

VS normal

head neck - pink palpebrae, moist mucus membranes

chest - CTA, no increased work of breathing

cvs - nsr, no murmur

abd - soft, non-distended, minimal incisional 

pain, NO peritoneal signs

wounds - dressings dry and intact



UOP: 525



Assessment: Expected post-operative course



Plan: Discharge home today - instructions given 

verbally to the patient

Crush al essential medications with water

Bariatric clear liquid diet

Ambulate 3 times a day

Call IMMEDIATELY for worsening abdominal pain, 

chest pain, shortness of breath,

persistent vomiting, left shoulder pain, vomiting 

blood, passing blood per

rectum, fever, dizziness, inability to drink 

liquids, or any other concerning

new symptom





-- DISCHARGE MEDICATIONS --



ALLERGIES:

No Known Allergies (UNKNOWN - Allergy)

[EXTERNAL] No Known Drug Intolerances (UNKNOWN - 

External allergies are for

display only, consider adding to medical record 

for drug interaction check)





-- DISCHARGE INSTRUCTIONS --



ADDTIONAL DISCHARGE INSTRUCTIONS:

Emergency Instructions: The patient was 

instructed to present to the nearest

Emergency Department or call 911 should their 

symptoms return or worsen.;



-- OBJECTIVE --



VITALS ( 08:36 -  08:36):

Temperature C: 36.9 (36.4 - 37.0)

Temperature source: Oral

Pulse Rate 87 (64 - 93)

Respiratory rate: 17 (16 - 18)

BP: 124/77 (108/69 - 144/95)



I/Os ( 07:00 -  07:00):

Net 1,935.00

Intake 1,935.00



-- DATA --



LABS



UR HCG QL (22 08:39)

UR HCG QUAL NEGATIVE



CBC W/AUTO DIFF (22 08:39)

WHITE BLOOD CELL 6.4

RED BLOOD CELL  4.94

HEMOGLOBIN 14.1

HEMATOCRIT 42.6

MEAN CELL VOLUME 86.2

MEAN CELL HGB 28.5

MEAN CELL HGB CONCENTRATION 33.1

RED CELL DISTRIBUTION WIDTH 13.4

PLATELET COUNT 283

MEAN PLATELET VOLUME 11.2

NEUTROPHIL % 60.9

LYMPHOCYTE %  25.8

MONOCYTE % 9.9 H

EOSINOPHIL % 2.7

BASOPHIL % 0.5

NEUTROPHIL # 3.87

LYMPHOCYTE #  1.64

MONOCYTE # 0.63

EOSINOPHIL # 0.17

BASOPHIL # 0.03



Signed in PatientKeeper by Bola Minaya MD on 

22 at 08:37







Electronically Signed by Bola Minaya MD on 

22 at 0837

****************************ATTENTION************

**********************

*EDITS and/or ADDENDA must be made in Patient 

Keeper for this note. *

* Edits and ammendments created in [employer] are 

not visible *

* in Patient Keeper or the legal medical record 

([facility]). *

*************************************************

**********************

[facility] #: [id number]

***END OF REPORT***                                 [facility]

 

                                        2022 15:00:00 

[facility]

Operative Report

REPORT #: [id number] REPORT STATUS: Signed

DATE: 22 TIME: 1500



PATIENT: JORDYN BENNETT UNIT #: [id number]

ACCOUNT #: [id number] ROOM #: [facility] BED: 06

: [birth year] AGE: 41 SEX: F ATTEND: 

Bola Minaya MD

ADM DT: 22 AUTHOR: Bola Minaya MD



****************************ATTENTION************

**********************

*EDITS and/or ADDENDA must be made in Patient 

Keeper for this note. *

* Edits and ammendments created in [employer] are 

not visible *

* in Patient Keeper or the legal medical record 

([facility]).  *

*************************************************

**********************



-- OPERATION --



SURGERY START DATE/TIME:

2022 15:01



PRE-OPERATIVE DIAGNOSIS:

morbid obesity GERD / heartburn hiatal hernia 

family history of vasc disease

previous gastroschisis repair (abdominal wall 

deformity)



POST-OPERATIVE DIAGNOSIS:

same, intraabdominal adhesions, including dense 

adherence of distal stomach to

anterior abdominal wall, gastrotomy



NAME OF PROCEDURE:

Robotic/Laparoscopic Sleeve Gastrectomy and 

Robotic/Laparoscopic Reduction and

Repair of Hiatal Hernia, Laparoscopic lysis of 

adhesions



SURGEON:

Bola Minaya MD



ASSISTANT(S):

Rizwan HARRINGTON



ANESTHESIA:

General



ESTIMATED BLOOD LOSS:

20 ml's



FINDINGS:

as above



SPECIMEN(S) REMOVED AND/OR ALTERED:

lateral stomach



COMPLICATION(S):

gastrotomy after lysis of adhesions



-- DESCRIPTION --



DESCRIPTION OF TECHNIQUE/PROCEDURE:

Date:2022

Pathology specimen: none



Pre-op DVT prophylaxis: SCDs and lovenox

Pre-op Antibiotics: Cefazolin 2gm IV

Complications: gastrotomy post lysis of adhesions



Procedure:

The patient was prepped and draped in the supine 

position with the abdomen

exposed. An area in the LUQ adjacent to the 

umbilicus was infiltrated with a

mixture of exparel and 0.25% bupivicaine. A small 

incision was made, and a

blunt trochar with a camera inserted was passed 

into the peritoneal space under

direct vision. The abdomen was insufflated with 

CO2, and there were no

hemodymamic or oxygenation changes. Inspection 

revealed a wall of adhesions

superior to the trochar placement. A second 8 mm 

trochar was placed to the

right of midline. Using an enseal device 

adhesions were lysed first in the

midline, then laterally. The distal stomach was 

found to be densely adherent to

the anterior abdominal wall. This was mobilized, 

but a small gastrotomy was

created at the most densly adherent area. There 

was no visible leak from this

defect, and the OGT was seen nearby keeping the 

stomach decompressed. The left

lobe of the liver was noted to be adherent to the 

stomach and these adhesions

were divided. Areas in the lateral and medial 

right abdomen, and the lateral

left abdomen were infiltrated with the same local 

anesthetic (subdermal and

pre-peritoneal areas). An 8 mm trochar was placed 

in the right lateral

position, the 8mm trochar in the right medial 

position was replaced with a 12

mm trochar. An 8 mm trochar was placed in the 

left lateral abdomen, and the

original trochar was replaced with an 8 mm 

trochar. The [employer] surgical

robot was brought over the field and docked. The 

camera was targeted. A hiatal

hernia was observed at the hiatus. The surgeon 

broke scrub and went to the

console .The left lobe of the liver was elevated 

and held in place with a

self-retaining retractor (trio retractor). A 

dissection was started along the

distal greater curvature,  the omentum 

from the stomach, until the

lesser sac was exposed. The dissection was 

continue distally to within a few

centimeters of the pylorus. The dissection was 

then carried up along the

greater curve until the left delfin was exposed. 

The pars flacidum was divided

and dissection exposed the right crura. It was 

 from the esophagus and

proximal stomach. A dissection was then made both 

anteriorly and posteriorly

completing a 360 dissection. This allowed the EG 

junction to be reduced into

the abdomen without tension. Some posterior 

attachments were also divided,

completely mobilizing the lateral stomach. A 38 

fr bougie was then placed into

the stomach, near the pylorus, by the 

Anesthetist. It was drawn back slightly

against the lesser curve. A 60 mm stapler was 

exchanged. The first staple

firing was a green load fired from the distal 

most point of the greater curve

dissection towards the incisura, allowing for the 

presence of the bougie.

Subsequent staple loads (1 green and mult blue) 

were applied working

superiorly, until the stomach was transected near 

the angle of HIS. The remnant

stomach was moved to the left lateral abdomen. 

Inspection of the staple line

showed the staples to be well formed, oriented, 

with no significant gapping or

bleeding. The EG junction was then retracted 

laterally and a posterior crural

repair was done with interrupted 0 ethibond 

sutures. The bougie was removed and

replaced by an OG tube. Sixty ccs of methylene 

dyed blue NS was instilled into

the distal sleeve with good distention. Careful 

inspection did not reveal any

leak. The tube was suctioned and removed. The 

trio retractor was removed from

the peritoneal space. The surgeon re-scrubbed.. 

Inspection of the remnant

stomach did not suggest leakage from the previous 

defect. The 12 mm trochar

site was widened with a large clamp, and the 

remnant stomach was removed from

the abdomen. The 12 mm trochar site were then 

closed at the level of the fascia

with a suture passer and 0 vicryl suture. All 

sites were then closed at the

skin with a running 4-0 monocryl suture. The 

wounds were dressed with

steristrips and dry gauze. The patient was then 

taken to the recovery room in

stable condition.





Signed in PatientKeeper by Bola Minaya MD on 

22 at 15:13







Electronically Signed by Bola Minaya MD on 

22 at 1513

****************************ATTENTION************

**********************

*EDITS and/or ADDENDA must be made in Patient 

Keeper for this note. *

* Edits and ammendments created in [employer] are 

not visible *

* in Patient Keeper or the legal medical record 

([facility]). *

*************************************************

**********************

[facility] #: [id number]

***END OF REPORT***                                 [facility]

 

                                        2022 09:49:00 [id number]

[address] [place] [address]

PATIENT NAME: JORDYN BENNETT ADMIT DATE: 

22

ACCOUNT NO: [id number] ROOM NO: [facility]

MEDICAL RECORD NO: [id number]  AGE: 41

REPORT TYPE: eELECTROCARDIOGRAM SEX: F



ADMITTING PHYSICIAN: Bola Minaya MD

ATTENDING PHYSICIAN: Bola Minaya MD





Order:

[id number]

Test Reason : PREOP

Test Date/Time Stamp:

 09:49:37

Blood Pressure : ***/*** mmHG

Vent. Rate : 062 BPM Atrial Rate : 062 BPM

P-R Int : 142 ms QRS Dur : 092 ms

QT Int : 398 ms P-R-T Axes : 075 080 059 degrees

QTc Int : 403 ms



Normal sinus rhythm with sinus arrhythmia

Normal ECG

Confirmed by AMANDA LOMELI, OSCAR DAMON ([id number]) on 

2022 8:20:30 PM



Referred By: Bola Minaya Confirmed by:OSCAR TEJADA MD











Electronically Signed by Oscar Tejada MD 

on 22 at 













































PATIENT NAME: JORDYN BENNETT ACCOUNT #: 

[id number]                                        [facility]

## 2025-03-01 NOTE — ER
Nurse's Notes                                                                                     

 [facility]                                                                 

Name: Jordyn Bennett                                                                               

Age: 44 yrs                                                                                       

Sex: Female                                                                                       

: 1980                                                                                   

MRN: [id number]                                                                                   

Arrival Date: 2025                                                                          

Time: 14:08                                                                                       

Account#: [id number]                                                                            

Bed 6                                                                                             

Private MD:                                                                                       

Diagnosis: Iron deficiency anemia, unspecified                                                    

                                                                                                  

Presentation:                                                                                     

                                                                                             

14:26 Chief complaint: Patient states: lightheaded and near syncope that began yesterday,     ss  

      worse today. Hx of iron deficiency anemia. Coronavirus screen: Client denies travel out     

      of the [place] in the last 14 days. Ebola Screen: Patient denies exposure to infectious        

      person. Patient denies travel to an Ebola-affected area in the 21 days before illness       

      onset. Initial Sepsis Screen: Does the patient meet any 2 criteria? No. Patient's           

      initial sepsis screen is negative. Does the patient have a suspected source of              

      infection? No. Patient's initial sepsis screen is negative. Risk Assessment: Do you         

      want to hurt yourself or someone else? Patient reports no desire to harm self or            

      others. Onset of symptoms was 2025.                                            

14:26 Method Of Arrival: Ambulatory                                                           ss  

14:26 Acuity: ELIA 3                                                                           ss  

                                                                                                  

OB/GYN:                                                                                           

14:31 LMP 2025, Pregnancy unknown                                                        ss  

                                                                                                  

Historical:                                                                                       

- Allergies:                                                                                      

14:31 No Known Allergies;                                                                     ss  

- PMHx:                                                                                           

14:31 iron deficiency anemia;                                                                 ss  

- PSHx:                                                                                           

14:31 gastric bypass;                                                                         ss  

                                                                                                  

- Infectious Disease History:: Denies.                                                            

- Social history:: Smoking status: Patient denies any tobacco usage or history of.                

                                                                                                  

                                                                                                  

Screenin:48 [facility] ED Fall Risk Assessment (Adult) History of falling in the last 3 months,       iw  

      including since admission Yes- physiologic fall (2 pts) Confusion or Disorientation         

      Intoxicated or Sedated No (0 pts) Impaired Gait No (0 pts) Mobility Assist Device Used      

      No (0 pt) Altered Elimination No (0 pt) Score/Fall Risk Level 0 - 2 = Low Risk Oriented     

      to surroundings, Maintained a safe environment. Abuse screen: Denies injuries from          

      another. Tuberculosis screening: No symptoms or risk factors identified.                    

14:48 Nutritional screening: No deficits noted.                                               iw  

                                                                                                  

Assessment:                                                                                       

14:47 General: Appears in no apparent distress. Behavior is calm, cooperative. Pain: Denies   iw  

      pain. Neuro: Staples Agitation-Sedation Scale (RASS): Level of Consciousness is awake,     

      alert, obeys commands, Oriented to person, place, time, situation, Moves all                

      extremities. Full function. Cardiovascular: Reports fatigue, lightheadedness, Patient's     

      skin is warm and dry. Respiratory: Respiratory effort is even, unlabored, Respiratory       

      pattern is regular, symmetrical. GI: Abdomen is non-distended. Derm: Skin is intact, is     

      thin, Skin is pale. Musculoskeletal: Range of motion: intact in all extremities.            

16:04 Reassessment: iron infusion started to LAC.                                             iw  

16:24 Reassessment: Patient appears in no apparent distress at this time. Patient and/or      iw  

      family updated on plan of care and expected duration. Pain level reassessed. Patient is     

      alert, oriented x 3, equal unlabored respirations, skin warm/dry/pink.                      

                                                                                                  

Vital Signs:                                                                                      

14:26  / 86; Pulse 86; Resp 15; Temp 97.7(O); Pulse Ox 100% ; Weight 69.4 kg; Height 5  ss  

      ft. 6 in. ; Pain 0/10;                                                                      

17:42  / 55; Pulse 67; Resp 16; Pulse Ox 97% on R/A;                                    iw  

14:26 Body Mass Index 24.69 (69.40 kg, 167.64 cm)                                             ss  

14:26 Pain Scale: Adult                                                                       ss  

                                                                                                  

ED Course:                                                                                        

14:10 Patient arrived in ED.                                                                  ts1 

14:11 Crow Woodard FNP-C is PHCP.                                                          dr5 

14:11 Vicente Pierre MD is Attending Physician.                                               dr5 

14:14 Jackelyn Franco, RN is Primary Nurse.                                                   iw  

14:31 Triage completed.                                                                       ss  

14:31 Arm band placed on right wrist.                                                         ss  

14:32 Client placed on continuous cardiac and pulse oximetry monitoring. NIBP monitoring      iw  

      applied. Cardiac monitor on.                                                                

16:00 Inserted saline lock: 22 gauge in left antecubital area, using aseptic technique.       iw  

16:24 Patient has correct armband on for positive identification.                             iw  

17:42 No provider procedures requiring assistance completed. IV discontinued, intact,         iw  

      bleeding controlled, No redness/swelling at site. Pressure dressing applied.                

                                                                                                  

Administered Medications:                                                                         

15:16 Drug: NS 0.9% IV 1000 ml IV at 1000 ml once; to be given as a bolus over 60 minutes     aa5 

      Route: IV; Rate: 1000 ml; Site: right antecubital;                                          

16:30 Follow up: IV Status: Completed infusion                                                iw  

15:23 Not Given (Other Intervention Used; Placed in [facility]): iron sucrose 300 mg IV at per    

      protocol once; administer over 2 hrs                                                        

                                                                                                  

                                                                                                  

Medication:                                                                                       

14:49 VIS not applicable for this client.                                                     iw  

                                                                                                  

Outcome:                                                                                          

17:27 Discharge ordered by MD.                                                                dr5 

17:45 Discharged to home ambulatory,                                                          iw  

17:45 Condition: good                                                                             

17:45 Discharge instructions given to patient, Instructed on discharge instructions, follow       

      up and referral plans. Demonstrated understanding of instructions, follow-up care,          

17:46 Patient left the ED.                                                                    iw  

                                                                                                  

Signatures:                                                                                       

Jackelyn Franco, RN                     RN   iw                                                   

Judie Bates RN                     RN   aa5                                                  

Mary Berman RN                   RN   ss                                                   

Marcela Castillo, MART                     PAS  ts1                                                  

Crow Woodard, FNP-C                   FNP-Cdr5                                                  

                                                                                                  

**************************************************************************************************